# Patient Record
Sex: FEMALE | Race: WHITE | NOT HISPANIC OR LATINO | Employment: OTHER | URBAN - METROPOLITAN AREA
[De-identification: names, ages, dates, MRNs, and addresses within clinical notes are randomized per-mention and may not be internally consistent; named-entity substitution may affect disease eponyms.]

---

## 2021-12-15 ENCOUNTER — HOSPITAL ENCOUNTER (EMERGENCY)
Facility: CLINIC | Age: 81
Discharge: HOME | End: 2021-12-15
Attending: EMERGENCY MEDICINE | Admitting: EMERGENCY MEDICINE
Payer: MEDICARE

## 2021-12-15 ENCOUNTER — APPOINTMENT (OUTPATIENT)
Dept: CT IMAGING | Facility: CLINIC | Age: 81
End: 2021-12-15
Attending: EMERGENCY MEDICINE
Payer: MEDICARE

## 2021-12-15 VITALS
SYSTOLIC BLOOD PRESSURE: 148 MMHG | OXYGEN SATURATION: 95 % | WEIGHT: 140 LBS | DIASTOLIC BLOOD PRESSURE: 77 MMHG | HEIGHT: 65 IN | HEART RATE: 88 BPM | BODY MASS INDEX: 23.32 KG/M2 | RESPIRATION RATE: 18 BRPM | TEMPERATURE: 98 F

## 2021-12-15 DIAGNOSIS — R53.1 WEAKNESS: ICD-10-CM

## 2021-12-15 LAB
ALBUMIN UR-MCNC: NEGATIVE MG/DL
AMORPH CRY #/AREA URNS HPF: ABNORMAL /HPF
ANION GAP SERPL CALCULATED.3IONS-SCNC: 5 MMOL/L (ref 3–14)
APPEARANCE UR: ABNORMAL
BASOPHILS # BLD AUTO: 0.1 10E3/UL (ref 0–0.2)
BASOPHILS NFR BLD AUTO: 1 %
BILIRUB UR QL STRIP: NEGATIVE
BUN SERPL-MCNC: 10 MG/DL (ref 7–30)
CALCIUM SERPL-MCNC: 8.8 MG/DL (ref 8.5–10.1)
CHLORIDE BLD-SCNC: 107 MMOL/L (ref 94–109)
CO2 SERPL-SCNC: 26 MMOL/L (ref 20–32)
COLOR UR AUTO: ABNORMAL
CREAT SERPL-MCNC: 0.78 MG/DL (ref 0.52–1.04)
EOSINOPHIL # BLD AUTO: 0.5 10E3/UL (ref 0–0.7)
EOSINOPHIL NFR BLD AUTO: 7 %
ERYTHROCYTE [DISTWIDTH] IN BLOOD BY AUTOMATED COUNT: 14.1 % (ref 10–15)
GFR SERPL CREATININE-BSD FRML MDRD: 72 ML/MIN/1.73M2
GLUCOSE BLD-MCNC: 112 MG/DL (ref 70–99)
GLUCOSE UR STRIP-MCNC: NEGATIVE MG/DL
HCT VFR BLD AUTO: 42.6 % (ref 35–47)
HGB BLD-MCNC: 13.3 G/DL (ref 11.7–15.7)
HGB UR QL STRIP: NEGATIVE
HOLD SPECIMEN: NORMAL
IMM GRANULOCYTES # BLD: 0 10E3/UL
IMM GRANULOCYTES NFR BLD: 0 %
INR PPP: 2.48 (ref 0.85–1.15)
KETONES UR STRIP-MCNC: NEGATIVE MG/DL
LEUKOCYTE ESTERASE UR QL STRIP: NEGATIVE
LYMPHOCYTES # BLD AUTO: 1.7 10E3/UL (ref 0.8–5.3)
LYMPHOCYTES NFR BLD AUTO: 24 %
MAGNESIUM SERPL-MCNC: 2.4 MG/DL (ref 1.6–2.3)
MCH RBC QN AUTO: 28.4 PG (ref 26.5–33)
MCHC RBC AUTO-ENTMCNC: 31.2 G/DL (ref 31.5–36.5)
MCV RBC AUTO: 91 FL (ref 78–100)
MONOCYTES # BLD AUTO: 0.5 10E3/UL (ref 0–1.3)
MONOCYTES NFR BLD AUTO: 6 %
MUCOUS THREADS #/AREA URNS LPF: PRESENT /LPF
NEUTROPHILS # BLD AUTO: 4.4 10E3/UL (ref 1.6–8.3)
NEUTROPHILS NFR BLD AUTO: 62 %
NITRATE UR QL: NEGATIVE
NRBC # BLD AUTO: 0 10E3/UL
NRBC BLD AUTO-RTO: 0 /100
PH UR STRIP: 7 [PH] (ref 5–7)
PLATELET # BLD AUTO: 413 10E3/UL (ref 150–450)
POTASSIUM BLD-SCNC: 3.5 MMOL/L (ref 3.4–5.3)
RBC # BLD AUTO: 4.68 10E6/UL (ref 3.8–5.2)
RBC URINE: <1 /HPF
SODIUM SERPL-SCNC: 138 MMOL/L (ref 133–144)
SP GR UR STRIP: 1.02 (ref 1–1.03)
SQUAMOUS EPITHELIAL: <1 /HPF
TROPONIN I SERPL HS-MCNC: 7 NG/L
TSH SERPL DL<=0.005 MIU/L-ACNC: 1.86 MU/L (ref 0.4–4)
UROBILINOGEN UR STRIP-MCNC: NORMAL MG/DL
WBC # BLD AUTO: 7.1 10E3/UL (ref 4–11)
WBC URINE: 1 /HPF

## 2021-12-15 PROCEDURE — 85610 PROTHROMBIN TIME: CPT | Performed by: EMERGENCY MEDICINE

## 2021-12-15 PROCEDURE — 93005 ELECTROCARDIOGRAM TRACING: CPT

## 2021-12-15 PROCEDURE — 80048 BASIC METABOLIC PNL TOTAL CA: CPT | Performed by: EMERGENCY MEDICINE

## 2021-12-15 PROCEDURE — 81001 URINALYSIS AUTO W/SCOPE: CPT | Performed by: EMERGENCY MEDICINE

## 2021-12-15 PROCEDURE — 85025 COMPLETE CBC W/AUTO DIFF WBC: CPT | Performed by: EMERGENCY MEDICINE

## 2021-12-15 PROCEDURE — 36415 COLL VENOUS BLD VENIPUNCTURE: CPT | Performed by: EMERGENCY MEDICINE

## 2021-12-15 PROCEDURE — 70450 CT HEAD/BRAIN W/O DYE: CPT | Mod: ME

## 2021-12-15 PROCEDURE — 99285 EMERGENCY DEPT VISIT HI MDM: CPT | Mod: 25

## 2021-12-15 PROCEDURE — 84484 ASSAY OF TROPONIN QUANT: CPT | Performed by: EMERGENCY MEDICINE

## 2021-12-15 PROCEDURE — 83735 ASSAY OF MAGNESIUM: CPT | Performed by: EMERGENCY MEDICINE

## 2021-12-15 PROCEDURE — 84443 ASSAY THYROID STIM HORMONE: CPT | Performed by: EMERGENCY MEDICINE

## 2021-12-15 ASSESSMENT — ENCOUNTER SYMPTOMS
TREMORS: 1
WEAKNESS: 1
NUMBNESS: 0
DIZZINESS: 0
SHORTNESS OF BREATH: 0
VOMITING: 0
NAUSEA: 0
FREQUENCY: 0
FEVER: 0
ABDOMINAL PAIN: 0

## 2021-12-15 ASSESSMENT — MIFFLIN-ST. JEOR: SCORE: 1100.92

## 2021-12-15 NOTE — ED PROVIDER NOTES
"  History   Chief Complaint:  Generalized Weakness       The history is provided by the patient.      Lawanda Crawford is a 81 year old female with history of hypertension, hypercholesterolemia, and PE, on Warfarin, who presents with generalized weakness. Last night while watching TV, she had a sudden sense of whole body weakness. She also feels a sense of whole body shakiness. The patient does mention that she is a little behind on getting an INR test. She denies any dizziness, numbness, tingling, and syncope. She also denies any recent fever, vision changes, shortness of breath, chest pain, abdominal pain, nausea, vomiting, urinary frequency, or urinary urgency. She also denies any recent travel or history of stroke.     Review of Systems   Constitutional: Negative for fever.   Eyes: Negative for visual disturbance.   Respiratory: Negative for shortness of breath.    Cardiovascular: Negative for chest pain.   Gastrointestinal: Negative for abdominal pain, nausea and vomiting.   Genitourinary: Negative for frequency and urgency.   Neurological: Positive for tremors and weakness. Negative for dizziness, syncope and numbness.   All other systems reviewed and are negative.    Allergies:  Ofloxacin    Medications:  Amlodipine  Lexapro  Simvastatin  Warfarin    Past Medical History:     Major depressive disorder  Osteoporosis  Hypercholesterolemia  Hypertension  PE  Refractory sinusitis     Past Surgical History:    Melanoma excision  Bilateral cataract extraction with intraocular lens implant  Sinus surgery     Family History:    Mother: hypertension    Social History:  The patient presents to the ED alone. She is a retired nurse. She has a dog. For 15 years, she was a .     Physical Exam     Patient Vitals for the past 24 hrs:   BP Temp Temp src Pulse Resp SpO2 Height Weight   12/15/21 0915 (!) 148/77 98  F (36.7  C) Oral 88 18 95 % 1.651 m (5' 5\") 63.5 kg (140 lb)       Physical Exam "     General: Alert, appears well-developed and well-nourished. Cooperative.     In no distress  HEENT:  Head:  Atraumatic  Ears:  External ears are normal  Mouth/Throat:  Oropharynx is without erythema or exudate and mucous membranes are moist.   Eyes:   Conjunctivae normal and EOM are normal. No scleral icterus.    Pupils are equal, round, and reactive to light.   Neck:   Normal range of motion. Neck supple.  CV:  Normal rate, regular rhythm, normal heart sounds and radial pulses are 2+ and symmetric.  No murmur.  Resp:  Breath sounds are clear bilaterally    Non-labored, no retractions or accessory muscle use  GI:  Abdomen is soft, no distension, no tenderness. No rebound or guarding.  No CVA tenderness bilaterally  MS:  Normal range of motion. No edema.    Normal strength in all 4 extremities.     Back atraumatic.    No midline cervical, thoracic, or lumbar tenderness  Skin:  Warm and dry.  No rash or lesions noted.  Neuro:   Alert. Normal strength.  Sensation intact in all 4 extremities. GCS: 15    Cranial nerves 2-12 intact.  Psych: Normal mood and affect.  Lymph: No anterior or posterior cervical lymphadenopathy noted.      Emergency Department Course   ECG  ECG obtained at 1103, ECG read at 1111  Normal sinus rhythm. Low voltage QRS. Nonspecific T wave abnormality. Abnormal ECG.    Rate 73 bpm. WA interval 134 ms. QRS duration 82 ms. QT/QTc 406/447 ms. P-R-T axes 10 22 25.     Imaging:  CT Head w/o Contrast  Diffuse cerebral volume loss and cerebral white matter  changes consistent with chronic small vessel ischemic disease. No  evidence for acute intracranial pathology.  Report per radiology    Laboratory:  UA with microscopic: slightly cloudy appearance, mucus present, moderate amorphous crystals o/w WNL      Troponin (Collected 0925): 7    TSH with free T4 reflex: 1.86    Magnesium: 2.4 (H)    BMP: glucose 112 (H) o/w WNL (Creatinine 0.78)    INR: 2. 48 (H)    CBC: WBC 7.1, HGB 13.3,      Emergency  Department Course:  Reviewed:  I reviewed nursing notes, vitals, past medical history, Care Everywhere and [facility]    Assessments:  0947 I obtained history and examined the patient as noted above.   1235 I rechecked the patient and explained findings.     Disposition:  The patient was discharged to home.     Impression & Plan     CMS Diagnoses: None    Medical Decision Making:  Lawanda Crawford is a 81 year old female who presents for evaluation of weakness.  Associated symptoms today have included mild shakiness. The workup here in the emergency room was to evaluate for infections, metabolic, electrolyte, neurologic, muscle or cardiovascular causes.  Of note, there are no signs of pneumonia or UTI causing the symptoms.  In addition, I do not believe symptoms are due to CVA, TIA, myasthesia gravis, myopathy or acute coronary syndromes.  History is benign, exam & laboratory tests reassuring, and head CT negative for ICH.  Doubt spinal pathology or other central etiology of symptoms.  They were ambulated in ED and did well.  I believe supportive outpatient management is indicated; will have them followup with their primary care doctor in 1-2 days for a recheck. Return for any additional symptoms or worsening weakness.      Diagnosis:    ICD-10-CM    1. Weakness  R53.1      Scribe Disclosure:  I, Cheyanne Myers, am serving as a scribe at 9:20 AM on 12/15/2021 to document services personally performed by Hero Torres MD based on my observations and the provider's statements to me.              Hero Torres MD  12/15/21 1768

## 2021-12-16 LAB
ATRIAL RATE - MUSE: 73 BPM
DIASTOLIC BLOOD PRESSURE - MUSE: NORMAL MMHG
INTERPRETATION ECG - MUSE: NORMAL
P AXIS - MUSE: 10 DEGREES
PR INTERVAL - MUSE: 134 MS
QRS DURATION - MUSE: 82 MS
QT - MUSE: 406 MS
QTC - MUSE: 447 MS
R AXIS - MUSE: 22 DEGREES
SYSTOLIC BLOOD PRESSURE - MUSE: NORMAL MMHG
T AXIS - MUSE: 25 DEGREES
VENTRICULAR RATE- MUSE: 73 BPM

## 2022-01-15 ENCOUNTER — HOSPITAL ENCOUNTER (OUTPATIENT)
Facility: CLINIC | Age: 82
Setting detail: OBSERVATION
Discharge: HOME | End: 2022-01-17
Attending: EMERGENCY MEDICINE | Admitting: INTERNAL MEDICINE
Payer: MEDICARE

## 2022-01-15 ENCOUNTER — APPOINTMENT (OUTPATIENT)
Dept: MRI IMAGING | Facility: CLINIC | Age: 82
End: 2022-01-15
Attending: EMERGENCY MEDICINE
Payer: MEDICARE

## 2022-01-15 DIAGNOSIS — R42 DIZZINESS: ICD-10-CM

## 2022-01-15 DIAGNOSIS — E87.6 HYPOKALEMIA: ICD-10-CM

## 2022-01-15 LAB
ANION GAP SERPL CALCULATED.3IONS-SCNC: 6 MMOL/L (ref 3–14)
BASOPHILS # BLD AUTO: 0.1 10E3/UL (ref 0–0.2)
BASOPHILS NFR BLD AUTO: 1 %
BUN SERPL-MCNC: 12 MG/DL (ref 7–30)
CALCIUM SERPL-MCNC: 8.3 MG/DL (ref 8.5–10.1)
CHLORIDE BLD-SCNC: 106 MMOL/L (ref 94–109)
CO2 SERPL-SCNC: 25 MMOL/L (ref 20–32)
CREAT SERPL-MCNC: 0.7 MG/DL (ref 0.52–1.04)
EOSINOPHIL # BLD AUTO: 0.4 10E3/UL (ref 0–0.7)
EOSINOPHIL NFR BLD AUTO: 7 %
ERYTHROCYTE [DISTWIDTH] IN BLOOD BY AUTOMATED COUNT: 14.1 % (ref 10–15)
FLUAV RNA SPEC QL NAA+PROBE: NEGATIVE
FLUBV RNA RESP QL NAA+PROBE: NEGATIVE
GFR SERPL CREATININE-BSD FRML MDRD: 86 ML/MIN/1.73M2
GLUCOSE BLD-MCNC: 101 MG/DL (ref 70–99)
HCT VFR BLD AUTO: 38.6 % (ref 35–47)
HGB BLD-MCNC: 12.3 G/DL (ref 11.7–15.7)
IMM GRANULOCYTES # BLD: 0 10E3/UL
IMM GRANULOCYTES NFR BLD: 0 %
INR PPP: 2.67 (ref 0.85–1.15)
LYMPHOCYTES # BLD AUTO: 1.6 10E3/UL (ref 0.8–5.3)
LYMPHOCYTES NFR BLD AUTO: 26 %
MCH RBC QN AUTO: 28.5 PG (ref 26.5–33)
MCHC RBC AUTO-ENTMCNC: 31.9 G/DL (ref 31.5–36.5)
MCV RBC AUTO: 89 FL (ref 78–100)
MONOCYTES # BLD AUTO: 0.5 10E3/UL (ref 0–1.3)
MONOCYTES NFR BLD AUTO: 9 %
NEUTROPHILS # BLD AUTO: 3.5 10E3/UL (ref 1.6–8.3)
NEUTROPHILS NFR BLD AUTO: 57 %
NRBC # BLD AUTO: 0 10E3/UL
NRBC BLD AUTO-RTO: 0 /100
PLATELET # BLD AUTO: 347 10E3/UL (ref 150–450)
POTASSIUM BLD-SCNC: 3.3 MMOL/L (ref 3.4–5.3)
RBC # BLD AUTO: 4.32 10E6/UL (ref 3.8–5.2)
SARS-COV-2 RNA RESP QL NAA+PROBE: NEGATIVE
SODIUM SERPL-SCNC: 137 MMOL/L (ref 133–144)
WBC # BLD AUTO: 6.1 10E3/UL (ref 4–11)

## 2022-01-15 PROCEDURE — 36415 COLL VENOUS BLD VENIPUNCTURE: CPT

## 2022-01-15 PROCEDURE — G1004 CDSM NDSC: HCPCS

## 2022-01-15 PROCEDURE — 80048 BASIC METABOLIC PNL TOTAL CA: CPT

## 2022-01-15 PROCEDURE — 85025 COMPLETE CBC W/AUTO DIFF WBC: CPT

## 2022-01-15 PROCEDURE — 85610 PROTHROMBIN TIME: CPT

## 2022-01-15 PROCEDURE — 96361 HYDRATE IV INFUSION ADD-ON: CPT

## 2022-01-15 PROCEDURE — 99285 EMERGENCY DEPT VISIT HI MDM: CPT | Mod: 25

## 2022-01-15 PROCEDURE — 87636 SARSCOV2 & INF A&B AMP PRB: CPT

## 2022-01-15 PROCEDURE — 93005 ELECTROCARDIOGRAM TRACING: CPT

## 2022-01-15 PROCEDURE — G0378 HOSPITAL OBSERVATION PER HR: HCPCS

## 2022-01-15 PROCEDURE — 258N000003 HC RX IP 258 OP 636

## 2022-01-15 PROCEDURE — 250N000013 HC RX MED GY IP 250 OP 250 PS 637

## 2022-01-15 PROCEDURE — 99220 PR INITIAL OBSERVATION CARE,LEVEL III: CPT | Performed by: INTERNAL MEDICINE

## 2022-01-15 PROCEDURE — 250N000013 HC RX MED GY IP 250 OP 250 PS 637: Performed by: EMERGENCY MEDICINE

## 2022-01-15 PROCEDURE — C9803 HOPD COVID-19 SPEC COLLECT: HCPCS

## 2022-01-15 RX ORDER — AMLODIPINE BESYLATE 10 MG/1
5 TABLET ORAL DAILY
COMMUNITY
Start: 2021-12-20

## 2022-01-15 RX ORDER — WARFARIN SODIUM 5 MG/1
TABLET ORAL
COMMUNITY
Start: 2021-11-12

## 2022-01-15 RX ORDER — POTASSIUM CHLORIDE 1500 MG/1
20 TABLET, EXTENDED RELEASE ORAL ONCE
Status: COMPLETED | OUTPATIENT
Start: 2022-01-15 | End: 2022-01-15

## 2022-01-15 RX ORDER — ESCITALOPRAM OXALATE 20 MG/1
20 TABLET ORAL DAILY
COMMUNITY
Start: 2021-12-11

## 2022-01-15 RX ORDER — DIAZEPAM 5 MG
5 TABLET ORAL ONCE
Status: COMPLETED | OUTPATIENT
Start: 2022-01-15 | End: 2022-01-15

## 2022-01-15 RX ORDER — SIMVASTATIN 20 MG
20 TABLET ORAL AT BEDTIME
COMMUNITY
Start: 2021-12-01

## 2022-01-15 RX ORDER — MECLIZINE HYDROCHLORIDE 25 MG/1
25 TABLET ORAL ONCE
Status: COMPLETED | OUTPATIENT
Start: 2022-01-15 | End: 2022-01-15

## 2022-01-15 RX ORDER — CHOLECALCIFEROL (VITAMIN D3) 50 MCG
1 TABLET ORAL DAILY
COMMUNITY
End: 2023-10-30

## 2022-01-15 RX ADMIN — MECLIZINE HYDROCHLORIDE 25 MG: 25 TABLET ORAL at 20:38

## 2022-01-15 RX ADMIN — DIAZEPAM 5 MG: 5 TABLET ORAL at 21:31

## 2022-01-15 RX ADMIN — POTASSIUM CHLORIDE 20 MEQ: 1500 TABLET, EXTENDED RELEASE ORAL at 20:38

## 2022-01-15 RX ADMIN — SODIUM CHLORIDE 500 ML: 9 INJECTION, SOLUTION INTRAVENOUS at 19:24

## 2022-01-16 LAB
ATRIAL RATE - MUSE: 88 BPM
DIASTOLIC BLOOD PRESSURE - MUSE: NORMAL MMHG
HOLD SPECIMEN: NORMAL
INR PPP: 2.45 (ref 0.85–1.15)
INTERPRETATION ECG - MUSE: NORMAL
P AXIS - MUSE: 23 DEGREES
POTASSIUM BLD-SCNC: 3.5 MMOL/L (ref 3.4–5.3)
PR INTERVAL - MUSE: 144 MS
QRS DURATION - MUSE: 76 MS
QT - MUSE: 376 MS
QTC - MUSE: 454 MS
R AXIS - MUSE: 53 DEGREES
SYSTOLIC BLOOD PRESSURE - MUSE: NORMAL MMHG
T AXIS - MUSE: 3 DEGREES
VENTRICULAR RATE- MUSE: 88 BPM

## 2022-01-16 PROCEDURE — 96360 HYDRATION IV INFUSION INIT: CPT

## 2022-01-16 PROCEDURE — 36415 COLL VENOUS BLD VENIPUNCTURE: CPT | Performed by: INTERNAL MEDICINE

## 2022-01-16 PROCEDURE — 99224 PR SUBSEQUENT OBSERVATION CARE,LEVEL I: CPT | Performed by: PHYSICIAN ASSISTANT

## 2022-01-16 PROCEDURE — G0378 HOSPITAL OBSERVATION PER HR: HCPCS

## 2022-01-16 PROCEDURE — 99207 PR MOONLIGHTING INDICATOR: CPT | Performed by: PHYSICIAN ASSISTANT

## 2022-01-16 PROCEDURE — 250N000013 HC RX MED GY IP 250 OP 250 PS 637: Performed by: INTERNAL MEDICINE

## 2022-01-16 PROCEDURE — 258N000003 HC RX IP 258 OP 636: Performed by: INTERNAL MEDICINE

## 2022-01-16 PROCEDURE — 85610 PROTHROMBIN TIME: CPT | Performed by: INTERNAL MEDICINE

## 2022-01-16 PROCEDURE — 84132 ASSAY OF SERUM POTASSIUM: CPT | Performed by: INTERNAL MEDICINE

## 2022-01-16 RX ORDER — WARFARIN SODIUM 5 MG/1
5 TABLET ORAL ONCE
Status: COMPLETED | OUTPATIENT
Start: 2022-01-16 | End: 2022-01-16

## 2022-01-16 RX ORDER — ACETAMINOPHEN 325 MG/1
650 TABLET ORAL EVERY 6 HOURS PRN
Status: DISCONTINUED | OUTPATIENT
Start: 2022-01-16 | End: 2022-01-17 | Stop reason: HOSPADM

## 2022-01-16 RX ORDER — AMOXICILLIN 250 MG
2 CAPSULE ORAL 2 TIMES DAILY PRN
Status: DISCONTINUED | OUTPATIENT
Start: 2022-01-16 | End: 2022-01-17 | Stop reason: HOSPADM

## 2022-01-16 RX ORDER — SODIUM CHLORIDE 9 MG/ML
INJECTION, SOLUTION INTRAVENOUS CONTINUOUS
Status: ACTIVE | OUTPATIENT
Start: 2022-01-16 | End: 2022-01-16

## 2022-01-16 RX ORDER — ESCITALOPRAM OXALATE 20 MG/1
20 TABLET ORAL DAILY
Status: DISCONTINUED | OUTPATIENT
Start: 2022-01-16 | End: 2022-01-17 | Stop reason: HOSPADM

## 2022-01-16 RX ORDER — ONDANSETRON 4 MG/1
4 TABLET, ORALLY DISINTEGRATING ORAL EVERY 6 HOURS PRN
Status: DISCONTINUED | OUTPATIENT
Start: 2022-01-16 | End: 2022-01-17 | Stop reason: HOSPADM

## 2022-01-16 RX ORDER — ONDANSETRON 2 MG/ML
4 INJECTION INTRAMUSCULAR; INTRAVENOUS EVERY 6 HOURS PRN
Status: DISCONTINUED | OUTPATIENT
Start: 2022-01-16 | End: 2022-01-17 | Stop reason: HOSPADM

## 2022-01-16 RX ORDER — DIPHENHYDRAMINE HYDROCHLORIDE 50 MG/ML
25 INJECTION INTRAMUSCULAR; INTRAVENOUS EVERY 6 HOURS PRN
Status: DISCONTINUED | OUTPATIENT
Start: 2022-01-16 | End: 2022-01-17 | Stop reason: HOSPADM

## 2022-01-16 RX ORDER — AMOXICILLIN 250 MG
1 CAPSULE ORAL 2 TIMES DAILY PRN
Status: DISCONTINUED | OUTPATIENT
Start: 2022-01-16 | End: 2022-01-17 | Stop reason: HOSPADM

## 2022-01-16 RX ORDER — AMLODIPINE BESYLATE 10 MG/1
10 TABLET ORAL DAILY
Status: DISCONTINUED | OUTPATIENT
Start: 2022-01-16 | End: 2022-01-17 | Stop reason: HOSPADM

## 2022-01-16 RX ORDER — ACETAMINOPHEN 650 MG/1
650 SUPPOSITORY RECTAL EVERY 6 HOURS PRN
Status: DISCONTINUED | OUTPATIENT
Start: 2022-01-16 | End: 2022-01-17 | Stop reason: HOSPADM

## 2022-01-16 RX ORDER — MECLIZINE HCL 12.5 MG 12.5 MG/1
12.5 TABLET ORAL 3 TIMES DAILY PRN
Status: DISCONTINUED | OUTPATIENT
Start: 2022-01-16 | End: 2022-01-17 | Stop reason: HOSPADM

## 2022-01-16 RX ADMIN — WARFARIN SODIUM 5 MG: 5 TABLET ORAL at 19:13

## 2022-01-16 RX ADMIN — AMLODIPINE BESYLATE 10 MG: 10 TABLET ORAL at 08:04

## 2022-01-16 RX ADMIN — ESCITALOPRAM OXALATE 20 MG: 20 TABLET ORAL at 08:04

## 2022-01-16 RX ADMIN — SODIUM CHLORIDE: 9 INJECTION, SOLUTION INTRAVENOUS at 01:39

## 2022-01-16 RX ADMIN — MECLIZINE 12.5 MG: 12.5 TABLET ORAL at 17:02

## 2022-01-16 ASSESSMENT — MIFFLIN-ST. JEOR: SCORE: 1140.83

## 2022-01-16 NOTE — PROGRESS NOTES
[facility]  Hospitalist Progress Note  Date of Service (when I saw the patient): 01/16/2022    Summary:  Lawanda Crawford is a 81 year old year old female who has a PMH significant for HTN on amlodipine, HLD on simvastatin, PE on warfarin and depressive disorder on Lexapro. Pt was admitted to [facility] on 1/15/2022 after presenting with complaints of unsteadiness and vertigo.  The following problems were addressed during her hospitalization:    Assessment & Plan:  Suspected vertigo  Has had similar sx in past. Was in ED 12/15 for same, CT head negative at time, TSH normal. Sx lasted less than a day. Then  ~3 hours PTA with sudden onset of weakness and unsteadiness again. Laid down in bed and called EMS. Denies other neuro sx. K 3.3 otherwise labs normal. Exam with horizontal nystagmus beating to L. MRI without acute infarct, incidental 5mm meningioma seen without mass effect or edema. Orthostatics negative 1/16  - Continue telemetry   - gentle fluids  - PT for vestibular therapy  --Unfortunately, no vestibular PT available today, assessment scheduled for tomorrow morning  - prns available for dizziness     Hypokalemia  K 3.3  - replace per protocol     Hx recurrent PE  [warfarin]  INR 2.67 on presentation.  - pharmacy consult for warfarin     Hypertension  HLD  [amlodipine 10 mg daily, simvastatin 20 mg at HS]  - hold statin in obs  - continue amlodipine     Depression  [escitalopram 20 mg daily]  - Continue     COVID-19 negative      DVT Prophylaxis: Warfarin  Code Status: No CPR- Do NOT Intubate  Disposition: Expected discharge in 1-2 days once PT evaluation complete.    The patient's care was discussed with the Attending Physician, Dr. Gu, Bedside Nurse, Care Coordinator/, and Patient.    Radha Xiong PA-C    Interval History   Feeling ok today. Still some dizziness at times with rest and with movement. PRNs don't seem to help much. No PT yet,  but pt hopeful that it will be helpful. Discussed causes of vertigo and course will likely take some time to improve (days/weeks vs hours). Pt very understanding. Denies fevers, chills, nausea, vomiting, chest pain, difficulty breathing, headache, or confusion.     -Data reviewed today: I reviewed all new labs and imaging results over the last 24 hours. I personally reviewed no images or EKG's today.    Physical Exam   Temp: 96.8  F (36  C) Temp src: Oral BP: 130/71 Pulse: 91   Resp: 16 SpO2: 95 % O2 Device: None (Room air)    Vitals:    01/16/22 0102   Weight: 67.5 kg (148 lb 12.8 oz)     Vital Signs with Ranges  Temp:  [96.8  F (36  C)-98.9  F (37.2  C)] 96.8  F (36  C)  Pulse:  [76-91] 91  Resp:  [16-20] 16  BP: (123-143)/(70-79) 130/71  SpO2:  [92 %-96 %] 95 %  No intake/output data recorded.    Constitutional: alert, oriented, no acute distress  Eyes: No scleral icterus or injection  ENT: Normocephalic, atraumatic, moist mucus membranes  Respiratory: No secondary muscle use or labored breathing. Lungs clear to auscultation bilaterally without wheezes or rhonchi   Cardiovascular: RRR without murmur  GI: Abdomen soft, non-tender to palpation, non-distended.  Bowel sounds active  MSK: able to move extremities on command without weakness, walks without weakness or ataxia  Skin/Integumen: warm, dry, in tact without rash or hives  Neuro:CN II-XII grossly in tact    Medications     sodium chloride 75 mL/hr at 01/16/22 0139       amLODIPine  10 mg Oral Daily     escitalopram  20 mg Oral Daily     Data   Recent Labs   Lab 01/16/22  0255 01/15/22  1902   WBC  --  6.1   HGB  --  12.3   MCV  --  89   PLT  --  347   INR  --  2.67*   NA  --  137   POTASSIUM 3.5 3.3*   CHLORIDE  --  106   CO2  --  25   BUN  --  12   CR  --  0.70   ANIONGAP  --  6   VINCE  --  8.3*   GLC  --  101*       Recent Results (from the past 24 hour(s))   MR Brain w/o Contrast    Narrative    EXAM: MR BRAIN W/O CONTRAST  LOCATION: [facility]  [facility]  DATE/TIME: 1/15/2022 11:29 PM    INDICATION: Dizziness, non-specific  COMPARISON: None.  TECHNIQUE: Routine multiplanar multisequence head MRI without intravenous contrast.    FINDINGS:  INTRACRANIAL CONTENTS: No acute or subacute infarct. No large mass, acute hemorrhage, or extra-axial fluid collections. Patchy and confluent nonspecific T2/FLAIR hyperintensities within the cerebral white matter most consistent with moderate chronic   microvascular ischemic change. Mild generalized cerebral atrophy. No hydrocephalus. Normal position of the cerebellar tonsils. Left parietal convexity small 5 mm extra-axial lesion nonspecific likely incidental meningioma. No significant mass effect on   the adjacent brain parenchyma. No adjacent vasogenic edema.    SELLA: No abnormality accounting for technique.    OSSEOUS STRUCTURES/SOFT TISSUES: Normal marrow signal. The major intracranial vascular flow voids are maintained.     ORBITS: No abnormality accounting for technique.     SINUSES/MASTOIDS: Mild mucosal thickening scattered about the paranasal sinuses. No middle ear or mastoid effusion.       Impression    IMPRESSION:    1.  No acute infarct.  2.  Age-related changes described above.  3.  Left parietal convexity small 5 mm extra-axial lesion nonspecific likely incidental meningioma. No significant mass effect on the adjacent brain parenchyma. No adjacent vasogenic edema.

## 2022-01-16 NOTE — ED NOTES
Bed: ED15  Expected date:   Expected time:   Means of arrival:   Comments:  451  82 F dizziness  1811

## 2022-01-16 NOTE — PROVIDER NOTIFICATION
MD Notification    Notified Person: MD    Notified Person Name:  Jamaal     Notification Date/Time: 01/16/22 @ 02:08    Notification Interaction:    Purpose of Notification:   FYI: k+ was replaced @ 2038 but a recheck was not ordered. Pt doesn't have a K+ RN managed order in place.     Orders Received:    Comments:

## 2022-01-16 NOTE — H&P
"[facility]    History and Physical  Hospitalist       Date of Admission:  1/15/2022  Date of Service (when I saw the patient): 01/15/22    Assessment & Plan   Lawanda Crawford is a 81 year old female who presents with unsteadiness, dizzines.     Suspected vertigo  Has had similar sx in past. Was in ED 12/15 for same, CT head negative at time, TSH normal. Sx lasted less than a day. Then  ~3 hours PTA with sudden onset of weakness and unsteadiness again. Laid down in bed and called EMS. Denies other neuro sx. K 3.3 otherwise labs normal. Exam with horizontal nystagmus beating to L. MRI without acute infarct, incidental 5mm meningioma seen without mass effect or edema.   - telemetry   - orthostatics  - gentle fluids  - PT for vestibular therapy  - prns available for dizziness    Hypokalemia  K 3.3  - replace per protocol    Hx recurrent PE  [warfarin]  INR 2.67 on presentation.  - pharmacy consult for warfarin    Hypertension  HLD  [amlodipine 10 mg daily, simvastatin 20 mg at HS]  - hold statin in obs  - continue amlodipine    Depression  [escitalopram 20 mg daily]  - resume    COVID-19 negative    DVT Prophylaxis: Ambulate every shift  Code Status: DNR / DNI    Disposition: Expected discharge in 1-2 days     Yash Hinton MD  [phone] (P)  Text Page     Primary Care Physician   Physician No Ref-Primary    Chief Complaint   dizziness    History is obtained from the patient and medical records    History of Present Illness   Lawanda Crawford is a 81 year old female who presents with dizziness and an abnormal sensation in her torso.  She has had \"fluttering in her torso \"in the past and on December 15 presented to the hospital because it became very severe.  She describes it as being contained in her torso goes in the front from her chest down to her lower abdomen.  She describes it as a fluttering sensation.  She feels like it is her whole anterior.  She had no chest pain or " palpitations with this.  Denies any nausea or abdominal pain.  She did not have dizziness.  She did not have any vision changes.  Evaluation was done in the emergency department at the time including CT head which was negative and blood work was generally unremarkable.  She was sent home on states his symptoms resolved by the next day.  She states she was back to her self again and then on the day of presentation she was watching television then something happened.  She states she got the fluttering sensation in her chest but she also got dizziness with a spinning aspect she describes.  Denies any chest pain or palpitations.  She states when she got up to walk she was dizzy.  No new vision problems.  No weakness.  No numbness or tingling.  She had no recent illnesses that she knows of.  Symptoms are at rest and with motion.    Past Medical History    I have reviewed this patient's medical history and updated it with pertinent information if needed.   Recurrent pulmonary emboli  Hypertension  Depression    Past Surgical History   I have reviewed this patient's surgical history and updated it with pertinent information if needed.  No past surgical history on file.    Prior to Admission Medications   Prior to Admission Medications   Prescriptions Last Dose Informant Patient Reported? Taking?   amLODIPine (NORVASC) 10 MG tablet 1/15/2022 at am  Yes Yes   Sig: Take 10 mg by mouth daily   escitalopram (LEXAPRO) 20 MG tablet 1/15/2022 at am  Yes Yes   Sig: Take 20 mg by mouth daily   simvastatin (ZOCOR) 20 MG tablet 1/14/2022 at pm  Yes Yes   Sig: Take 20 mg by mouth At Bedtime   vitamin D3 (CHOLECALCIFEROL) 50 mcg (2000 units) tablet 1/15/2022 at am  Yes Yes   Sig: Take 1 tablet by mouth daily   warfarin ANTICOAGULANT (COUMADIN) 5 MG tablet 1/14/2022 at pm x7.5 mg  Yes Yes   Sig: Take 5 mg (5 mg x 1 TABLET) BY MOUTH every Sun, Tue, Thu; AND TAKE 7.5 mg (5 mg x 1.5 TABLETS) all other days OR as directed by INR clinic.       Facility-Administered Medications: None     Allergies   Allergies   Allergen Reactions     Ofloxacin      light headedness       Social History   I have reviewed this patient's social history and updated it with pertinent information if needed. Lawanda Crawford  denies alcohol or tobacco use.     Family History   I have reviewed this patient's family history and updated it with pertinent information if needed.   Includes heart disease in grandparents, HTN/ CVA in MGM, mom with hypertension.     Review of Systems   The 10 point Review of Systems is negative other than noted in the HPI or here.     Physical Exam   Temp: 98.9  F (37.2  C) Temp src: Temporal BP: 137/73 Pulse: 80   Resp: 16 SpO2: 92 % O2 Device: None (Room air)    Vital Signs with Ranges  0 lbs 0 oz    Constitutional: alert, oriented and in no acute distress  Eyes: EOMI, PERRL. L sided nystagmus noted  HEENT: OP clear  Respiratory: CTA B without w/c  Cardiovascular: RRR ELIZABETH noted. no edema.  GI: soft, nontender, nondistended, BS present  Lymph/Hematologic: no cervical LAD  Genitourinary: deferred  Skin: no rashes or lesions grossly  Musculoskeletal: no deformities or arthritis  Neurologic: CN II-XII, BOATENG  Psychiatric: mood and affect wnl    Data   Data reviewed today:  I personally reviewed the EKG tracing showing NSR and the brain MRI image(s) showing no acute CVA pathology.  Recent Labs   Lab 01/15/22  1902   WBC 6.1   HGB 12.3   MCV 89      INR 2.67*      POTASSIUM 3.3*   CHLORIDE 106   CO2 25   BUN 12   CR 0.70   ANIONGAP 6   VINCE 8.3*   *       No results found for this or any previous visit (from the past 24 hour(s)).

## 2022-01-16 NOTE — ED PROVIDER NOTES
Emergency Department Attending Supervision Note  1/15/2022  10:45 PM    I evaluated this patient in conjunction with PASQUALE Oliver    Briefly, the patient presented with concern of dizziness.  This began this afternoon.  She has had this before.  She has been seen for this before and had unremarkable work-up.    On my exam:  Patient Vitals for the past 24 hrs:   BP Temp Temp src Pulse Resp SpO2   01/15/22 2030 137/73 -- -- 80 -- 92 %   01/15/22 2000 129/70 -- -- 77 -- 93 %   01/15/22 1930 (!) 142/73 -- -- 84 -- 93 %   01/15/22 1900 137/79 -- -- 89 -- 92 %   01/15/22 1829 (!) 143/75 98.9  F (37.2  C) Temporal 88 16 92 %     Physical Exam   General:  Sitting on bed by self.   HENT:  No obvious trauma to head  Right Ear:  External ear normal.   Left Ear:  External ear normal.   Nose:  Nose normal.   Eyes:  Conjunctivae and EOM are normal. Pupils are equal, round, and reactive.   Neck: Normal range of motion. Neck supple. No tracheal deviation present.   CV:  Normal heart sounds. No murmur heard.  Pulm/Chest: Effort normal and breath sounds normal.   Abd: Soft. No distension. There is no tenderness. There is no rigidity, no rebound and no guarding.   M/S: Normal range of motion.   Neuro: Alert. GCS 15. CN II-XII Grossly intact, no pronator drift, normal finger-nose-finger, visual fields intact by confrontation. Muscle strength is +5 proximal and distal in the bilateral upper and lower extremities. No dysarthria. Normal palm up, palm down.  Horizontal nystagmus to the left.  Skin: Skin is warm and dry. No rash noted. Not diaphoretic.   Psych: Normal mood and affect. Behavior is normal.     Brief MDM:  Lawanda Crawford is a very pleasant 81 year old year old female who presents to the emergency department with concern of dizziness.  The patient did have a horizontal nystagmus to the left.  Basic labs and INR were obtained.  The patient had been seen for this before and had a CT of the head that was unremarkable.   There has been no trauma to warrant need for repeat CT imaging and hs has no headache of focal neuro deficit. Patient did not improve with the meclizine, fluids, potassium replacement or valium. Patient had a normal gait and no signs of dysdiadochokinesia, but given the lack of improvement with the interventions, we will obtain an MRI and admit the patient for continued evaluation and treatment. I spoke with Dr. Fitzgerald who has agreed to admit the patient.    My Impression and diagnosis:    ICD-10-CM    1. Dizziness  R42    2. Hypokalemia  E87.6          DO Jones Conley Robert James, DO  01/15/22 9295

## 2022-01-16 NOTE — PLAN OF CARE
PRIMARY DIAGNOSIS: GENERALIZED WEAKNESS    OUTPATIENT/OBSERVATION GOALS TO BE MET BEFORE DISCHARGE  1. Orthostatic performed: Yes:          Lying Orthostatic BP: 127/76         Sitting Orthostatic BP: 122/76         Standing Orthostatic BP: 110/68     2. Tolerating PO medications: Yes    3. Return to near baseline physical activity: Yes    4. Cleared for discharge by consultants (if involved): No    Discharge Planner Nurse   Safe discharge environment identified: Yes  Barriers to discharge: Yes       Entered by: Kim Aguilar 01/16/2022 8:14 AM     Please review provider order for any additional goals.   Nurse to notify provider when observation goals have been met and patient is ready for discharge.

## 2022-01-16 NOTE — PLAN OF CARE
PRIMARY DIAGNOSIS: GENERALIZED WEAKNESS     OUTPATIENT/OBSERVATION GOALS TO BE MET BEFORE DISCHARGE  1. Orthostatic performed: Yes:          Lying Orthostatic BP: 127/76         Sitting Orthostatic BP: 122/76         Standing Orthostatic BP: 110/68      2. Tolerating PO medications: Yes     3. Return to near baseline physical activity: Yes     4. Cleared for discharge by consultants (if involved): No     Discharge Planner Nurse   Safe discharge environment identified: Yes  Barriers to discharge: Yes         Please review provider order for any additional goals.   Nurse to notify provider when observation goals have been met and patient is ready for discharge.

## 2022-01-16 NOTE — ED NOTES
[facility]  ED Nurse Handoff Report    ED Chief complaint: Dizziness      ED Diagnosis:   Final diagnoses:   Dizziness   Hypokalemia       Code Status: Full Code    Allergies:   Allergies   Allergen Reactions     Ofloxacin      light headedness       Patient Story: Patient starting around 4-5pm today felt very dizzy even with sitting, pt decided to call EMS and came to ER.   Focused Assessment:  Pt continues to have dizziness with any change of position despite interventions of fluids and medication.    Treatments and/or interventions provided: IVF, meds, MRI  Patient's response to treatments and/or interventions: no changes, continues to be dizzy    To be done/followed up on inpatient unit:  MRI    Does this patient have any cognitive concerns?: None    Activity level - Baseline/Home:  Independent  Activity Level - Current:   Stand with Assist    Patient's Preferred language: English   Needed?: No    Isolation: None  Infection: Not Applicable  Patient tested for COVID 19 prior to admission: YES  Bariatric?: No    Vital Signs:   Vitals:    01/15/22 1900 01/15/22 1930 01/15/22 2000 01/15/22 2030   BP: 137/79 (!) 142/73 129/70 137/73   Pulse: 89 84 77 80   Resp:       Temp:       TempSrc:       SpO2: 92% 93% 93% 92%       Cardiac Rhythm:     Was the PSS-3 completed:   Yes  What interventions are required if any?               Family Comments: N/A  OBS brochure/video discussed/provided to patient/family: No              Name of person given brochure if not patient: Self              Relationship to patient: n/a    For the majority of the shift this patient's behavior was Green.   Behavioral interventions performed were none.    ED NURSE PHONE NUMBER: [id number]

## 2022-01-16 NOTE — PLAN OF CARE
RECEIVING UNIT ED HANDOFF REVIEW    ED Nurse Handoff Report was reviewed by: Saravanan Hinkle RN on January 16, 2022 at 12:43 AM

## 2022-01-16 NOTE — PLAN OF CARE
A/O x4. VSS on RA. Up with SBA. Regular diet. Continent. NS @75/hr. Tele: NSR. Ortho BP -ve. K+ replaced, last check: 3.5. MRI done last night, refer to results. Plan: PT consult for vestibular therapy.

## 2022-01-16 NOTE — ED PROVIDER NOTES
History     Chief Complaint:  Unsteadiness, weakness     [facility]   Lawanda Crawford is a 81 year old female with a PMH of HTN on amlodipine, HLD on simvastatin, PE on warfarin and depressive disorder on Lexapro who presents to the ED by EMS for evaluation of unsteadiness and generalized weakness. Per chart review, the patient presented here to the ED with the same complaint 12/15/21 and underwent thorough evaluation by Dr. Torres, including head CT EKG and Troponin, thyroid studies, urine, CBC, and metabolic panel, all of which were grossly unremarkable. The patient reports that this afternoon around 4pm (~3 hours prior to arrival here) she suddenly felt a generalized weakness throughout her body and unsteadiness. She states she laid down on her bed and called her daughter, as well as 911. EMS brought her into the ED and reported that she had a normal blood glucose. aLwanda denies any symptoms apart from the weakness and unsteadiness. No other neurologic symptoms including speech changes, facial droop, numbness/tingling, headache, visual disturbances. No chest pain, palpitations or shortness of breath. No abdominal pain, nausea, vomiting, diarrhea or blood in the stool. No urinary burning or urgency. No fever, sore throat or cough. The patient has no recent known exposure to illness. She mentions she received one Covid vaccination, but did not go forward with the second one or booster.    ROS:  A comprehensive ROS was obtained and is negative aside from those called out in the HPI above.    Allergies:  Ofloxacin     Medications:    Amlodipine  Lexapro  Simvastatin  Warfarin     Past Medical History:     Major depressive disorder  Osteoporosis  Hypercholesterolemia  Hypertension  PE  Refractory sinusitis     Past Surgical History:    Melanoma excision  Bilateral cataract extraction with intraocular lens implant  Sinus surgery      Family History:    Mother: hypertension     Social History:  The patient presents to  the ED by EMS.  She lives alone.  She does not drink ETOH.  She is a non-smoker.  She is a retired nurse. For 15 years, she was a .     Physical Exam     Patient Vitals for the past 24 hrs:   BP Temp Temp src Pulse Resp SpO2   01/16/22 0030 135/73 -- -- 78 -- 94 %   01/15/22 2359 -- -- -- -- -- 96 %   01/15/22 2358 127/77 -- -- 81 -- --   01/15/22 2030 137/73 -- -- 80 -- 92 %   01/15/22 2000 129/70 -- -- 77 -- 93 %   01/15/22 1930 (!) 142/73 -- -- 84 -- 93 %   01/15/22 1900 137/79 -- -- 89 -- 92 %   01/15/22 1829 (!) 143/75 98.9  F (37.2  C) Temporal 88 16 92 %        Physical Exam  General: Elderly female laying on [facility]. Appears comfortable. No distress.  HENT: Head atraumatic. No facial asymmetry. Patient wearing face mask. When taken off, mucous membranes appear moist.  Eyes: Wears glasses. Conjunctive and sclera clear. PERRLA. EOMs intact. On my initial exam, I did not  any nystagmus. However, when I reevaluated the patient with my attending physician she had a slightly leftward beating nystagmus in the left eye.  CV: Regular rate and rhythm. Normal S1, S2. No appreciable murmurs, gallops or rubs.  Resp: Lungs clear to auscultation bilaterally. Normal respiratory effort. Speaks in full sentences. No stridor or cough observed.  GI: Abdomen soft, non distended and nontender. No rebound or guarding.  MSK: No lower extremity edema.  Skin: Warm and dry.  Neuro: Awake, alert, oriented x3. GCS 15. Cranial nerves II through XII intact.  strength normal. Normal and equal strength in all extremities. No pronator drift.   Psych: Cooperative. Pleasant affect.      Emergency Department Course   ECG:  Performed at 1907. Read at 1908 by Dr. Goldman.  Vent rate 88. NH Interval 144ms. QRS duration 76ms. QT/QTc 376/454ms. PRT axes 23 53 3.  Normal sinus rhythm. Low voltage QRS.   Nonspecific T wave abnormality.  Abnormal ECG.   ECG is grossly unchanged from recent 12/15/21.    Imaging:  MR  Brain w/o Contrast   Final Result   IMPRESSION:     1.  No acute infarct.   2.  Age-related changes described above.   3.  Left parietal convexity small 5 mm extra-axial lesion nonspecific likely incidental meningioma. No significant mass effect on the adjacent brain parenchyma. No adjacent vasogenic edema.                  Laboratory:  Labs Ordered and Resulted from Time of ED Arrival to Time of ED Departure   INR - Abnormal       Result Value    INR 2.67 (*)    BASIC METABOLIC PANEL - Abnormal    Sodium 137      Potassium 3.3 (*)     Chloride 106      Carbon Dioxide (CO2) 25      Anion Gap 6      Urea Nitrogen 12      Creatinine 0.70      Calcium 8.3 (*)     Glucose 101 (*)     GFR Estimate 86     INFLUENZA A/B & SARS-COV2 PCR MULTIPLEX - Normal    Influenza A PCR Negative      Influenza B PCR Negative      SARS CoV2 PCR Negative     CBC WITH PLATELETS AND DIFFERENTIAL    WBC Count 6.1      RBC Count 4.32      Hemoglobin 12.3      Hematocrit 38.6      MCV 89      MCH 28.5      MCHC 31.9      RDW 14.1      Platelet Count 347      % Neutrophils 57      % Lymphocytes 26      % Monocytes 9      % Eosinophils 7      % Basophils 1      % Immature Granulocytes 0      NRBCs per 100 WBC 0      Absolute Neutrophils 3.5      Absolute Lymphocytes 1.6      Absolute Monocytes 0.5      Absolute Eosinophils 0.4      Absolute Basophils 0.1      Absolute Immature Granulocytes 0.0      Absolute NRBCs 0.0          Emergency Department Course:    Reviewed:  I reviewed nursing notes, vitals and past medical history    Assessments:  1900 I obtained history and examined the patient as noted above.     2020    My attending physician Dr. Goldman evaluated the patient and noted a slight horizontal nystagmus in the patient's left eye beating to the left. He ordered Meclizine and we discussed his suspicion for BPPV.    2221    I rechecked the patient after the Meclizine and Valium. She states her dizziness has persisted and she doesn't feel  improved at all from when she first presented.    My attending physician Dr. Goldman discussed the patient's case with Dr. Yash Regalado, who agreed to accept the patient for Observation overnight.    0040   The patient was still in the ED waiting for a room when her MRI results returned per above. I explained to her that there was no stroke, large mass or bleed on her imaging. We did discuss the small incidental meningioma and she understood it is benign, but should be followed over time. She will mention to her PCP.    Interventions:  Medications   0.9% sodium chloride BOLUS (0 mLs Intravenous Stopped 1/15/22 1954)   potassium chloride ER (KLOR-CON M) CR tablet 20 mEq (20 mEq Oral Given 1/15/22 2038)   meclizine (ANTIVERT) tablet 25 mg (25 mg Oral Given 1/15/22 2038)   diazepam (VALIUM) tablet 5 mg (5 mg Oral Given 1/15/22 2131)      Disposition:  The patient was admitted to the hospital for Observation  under the care of Dr. Yash Regalado.    Impression & Plan        Covid-19  Lawanda Crawford was evaluated during a global COVID-19 pandemic, which necessitated consideration that the patient might be at risk for infection with the SARS-CoV-2 virus that causes COVID-19.   Applicable protocols for evaluation were followed during the patient's care. COVID-19 was considered as part of the patient's evaluation and a test was ordered.    Medical Decision Making:  Lawanda is an 80yo female who presented tonight to the ED by EMS for a sudden onset of generalized weakness/unsteadiness per HPI above. Per chart review, the patient presented here to the ED with the same complaint 12/15/21 and underwent thorough evaluation by Dr. Torres, including head CT EKG and Troponin, thyroid studies, urine, CBC, and metabolic panel, all of which were grossly unremarkable.     EMS reported her blood sugar was normal by paramedics and her BMP did not show any hypoglycemia to explain her symptoms. Her BMP did show a mild hypokalemia and we  repleted her potassium here in the ED with oral potassium chloride. Since the patient is on Warfarin for past pulmonary embolism, I obtained an INR, which returned within therapeutic range and reassured me that another clot is unlikely. I also obtained an initial ECG, which was reassuring per above and she did not have concerning chest pain or other symptoms that made me suspicious for ACS. In light of the pandemic, I obtained a COVID-19 test, which also returned negative. CBC was also  unremarkable and no signs of low hemoglobin worrisome for anemia or leukocytosis concerning for infection.    We noted a leftward beating nystagmus in her left eye on reevaluation tonight, which points to more of a picture of benign proxismal positional vertigo. However, the patients symptoms did not resolve with the interventions above (Meclizine, Valium) and she felt too unsteady to go home safely. We discussed admitting her for observation and I ordered a brain MRI to investigate for a central etiology and that was pending at the time of admission. Her vitals remained stable through her course here and she appeared relatively comfortable while in the department. There were no red flag focal neurologic deficits on physical exam.    *Of note, before the patient was moved to a bed upstairs, her MRI returned per above with no worrisome findings such as infarct, bleed or large mass. We did discuss the incidental small meningioma per ED course above.    Diagnosis:    ICD-10-CM    1. Dizziness  R42    2. Hypokalemia  E87.6         Discharge Medications:  New Prescriptions    No medications on file        1/15/2022   Peggy PELAYOA APC-T  I saw and evaluated this patient under attending provider Dr. Ray Goldman.         Peggy Thorne PA-C  01/16/22 0058

## 2022-01-16 NOTE — ED NOTES
"Patient up to BSC with assistance of 1 person, states \"I am just spinning\" as soon as she stands up and moves.   "

## 2022-01-16 NOTE — PLAN OF CARE
PRIMARY DIAGNOSIS: GENERALIZED WEAKNESS     OUTPATIENT/OBSERVATION GOALS TO BE MET BEFORE DISCHARGE  1. Orthostatic performed: Yes:          Lying Orthostatic BP: 127/76         Sitting Orthostatic BP: 122/76         Standing Orthostatic BP: 110/68      2. Tolerating PO medications: Yes     3. Return to near baseline physical activity: Yes     4. Cleared for discharge by consultants (if involved): No     Discharge Planner Nurse   Safe discharge environment identified: Yes  Barriers to discharge: Yes       Entered by: Kim Aguilar 01/16/2022 8:14 AM  Please review provider order for any additional goals.   Nurse to notify provider when observation goals have been met and patient is ready for discharge.          A&Ox4. VSS. Ambulating SBA/ind. Reports mild dizziness, but improved from admission. Denies pain. Tolerating diet. Meclizine given x1. Plan for PT tomorrow.

## 2022-01-16 NOTE — PROGRESS NOTES
No therapist with vestibular training working today. Patient placed on schedule for tomorrow at 0815.

## 2022-01-17 ENCOUNTER — APPOINTMENT (OUTPATIENT)
Dept: PHYSICAL THERAPY | Facility: CLINIC | Age: 82
End: 2022-01-17
Attending: INTERNAL MEDICINE
Payer: MEDICARE

## 2022-01-17 VITALS
DIASTOLIC BLOOD PRESSURE: 67 MMHG | BODY MASS INDEX: 24.79 KG/M2 | HEART RATE: 83 BPM | TEMPERATURE: 97.5 F | HEIGHT: 65 IN | RESPIRATION RATE: 18 BRPM | OXYGEN SATURATION: 92 % | WEIGHT: 148.8 LBS | SYSTOLIC BLOOD PRESSURE: 110 MMHG

## 2022-01-17 LAB
ANION GAP SERPL CALCULATED.3IONS-SCNC: 6 MMOL/L (ref 3–14)
BUN SERPL-MCNC: 12 MG/DL (ref 7–30)
CALCIUM SERPL-MCNC: 8.5 MG/DL (ref 8.5–10.1)
CHLORIDE BLD-SCNC: 108 MMOL/L (ref 94–109)
CO2 SERPL-SCNC: 25 MMOL/L (ref 20–32)
CREAT SERPL-MCNC: 0.7 MG/DL (ref 0.52–1.04)
GFR SERPL CREATININE-BSD FRML MDRD: 86 ML/MIN/1.73M2
GLUCOSE BLD-MCNC: 91 MG/DL (ref 70–99)
INR PPP: 1.94 (ref 0.85–1.15)
POTASSIUM BLD-SCNC: 3.8 MMOL/L (ref 3.4–5.3)
SODIUM SERPL-SCNC: 139 MMOL/L (ref 133–144)

## 2022-01-17 PROCEDURE — 99217 PR OBSERVATION CARE DISCHARGE: CPT

## 2022-01-17 PROCEDURE — 97530 THERAPEUTIC ACTIVITIES: CPT | Mod: GP

## 2022-01-17 PROCEDURE — 250N000013 HC RX MED GY IP 250 OP 250 PS 637: Performed by: INTERNAL MEDICINE

## 2022-01-17 PROCEDURE — G0378 HOSPITAL OBSERVATION PER HR: HCPCS

## 2022-01-17 PROCEDURE — 97116 GAIT TRAINING THERAPY: CPT | Mod: GP

## 2022-01-17 PROCEDURE — 97161 PT EVAL LOW COMPLEX 20 MIN: CPT | Mod: GP

## 2022-01-17 PROCEDURE — 82310 ASSAY OF CALCIUM: CPT | Performed by: PHYSICIAN ASSISTANT

## 2022-01-17 PROCEDURE — 85610 PROTHROMBIN TIME: CPT | Performed by: PHYSICIAN ASSISTANT

## 2022-01-17 PROCEDURE — 97112 NEUROMUSCULAR REEDUCATION: CPT | Mod: GP

## 2022-01-17 PROCEDURE — 36415 COLL VENOUS BLD VENIPUNCTURE: CPT | Performed by: PHYSICIAN ASSISTANT

## 2022-01-17 RX ORDER — WARFARIN SODIUM 7.5 MG/1
7.5 TABLET ORAL
Status: DISCONTINUED | OUTPATIENT
Start: 2022-01-17 | End: 2022-01-17 | Stop reason: HOSPADM

## 2022-01-17 RX ADMIN — ESCITALOPRAM OXALATE 20 MG: 20 TABLET ORAL at 10:09

## 2022-01-17 RX ADMIN — AMLODIPINE BESYLATE 10 MG: 10 TABLET ORAL at 10:09

## 2022-01-17 NOTE — PLAN OF CARE
Physical Therapy Discharge Summary    Reason for therapy discharge:    All goals and outcomes met, no further needs identified.    Progress towards therapy goal(s). See goals on Care Plan in [facility] electronic health record for goal details.  Goals met    Therapy recommendation(s):    Continued therapy is recommended.  Rationale/Recommendations:  OP vestibular therapy if symptoms do not improve or worsen.

## 2022-01-17 NOTE — PLAN OF CARE
OUTPATIENT/OBSERVATION GOALS TO BE MET BEFORE DISCHARGE  1. Orthostatic performed: Yes:          Lying Orthostatic BP: 127/76         Sitting Orthostatic BP: 122/76         Standing Orthostatic BP: 110/68      2. Tolerating PO medications: Yes     3. Return to near baseline physical activity: Yes     4. Cleared for discharge by consultants (if involved): No      Nurse to notify provider when observation goals have been met and patient is ready for discharge.    A&Ox4. VSS. Denies pain. Reports dizziness has improved. PT consult complete. Plan to discharge today.

## 2022-01-17 NOTE — PROGRESS NOTES
01/17/22 0815   Quick Adds   Quick Adds Vestibular Eval   Type of Visit Initial PT Evaluation   Living Environment   People in home alone   Current Living Arrangements house  (one level townhouse)   Home Accessibility no concerns   Self-Care   Usual Activity Tolerance moderate   Current Activity Tolerance fair   Regular Exercise No   Equipment Currently Used at Home none   Disability/Function   Fall history within last six months no   General Information   Onset of Illness/Injury or Date of Surgery 01/15/22   Referring Physician Yash Hinton MD   Patient/Family Therapy Goals Statement (PT) Unsure if she can return home   Pertinent History of Current Problem (include personal factors and/or comorbidities that impact the POC) Pt admitted under observation status with dizziness, unsteady balance, hypokalemia, chest fluttering. PMH: recurrent PE, hypertension, depression.   Existing Precautions/Restrictions fall   Weight-Bearing Status - LLE full weight-bearing   Weight-Bearing Status - RLE full weight-bearing   Cognition   Orientation Status (Cognition) oriented x 4   Follows Commands (Cognition) WFL   Pain Assessment   Patient Currently in Pain Yes, see Vital Sign flowsheet  (L hip pain)   Posture    Posture Forward head position;Protracted shoulders   Range of Motion (ROM)   ROM Quick Adds ROM WFL   Strength   Manual Muscle Testing Quick Adds Strength WFL   Bed Mobility   Comment (Bed Mobility) Independent   Transfers   Transfer Safety Comments Independent   Gait/Stairs (Locomotion)   Comment (Gait/Stairs) Independent gait in room, slow but steady   Balance   Balance Comments Mild balance deficits, pt being more cautious than anything   Sensory Examination   Sensory Perception patient reports no sensory changes   Oculomotor Exam   Smooth Pursuit Normal   Saccades Normal   VOR Normal   Rapid Head Thrust Corrective Saccade L head thrust   Infrared Goggle Exam or Frenzel Lense Exam   Spontaneous Nystagmus Negative    Gaze Evoked Nystagmus Negative   Head Shake Horizontal Nystagmus Negative   Ghulam-Hallpike (Right) Negative   Ghulam-Hallpike (Left) Negative   HSCC Supine Roll Test (Right) Negative   HSCC Supine Roll Test (Left) Negative   Clinical Impression   Criteria for Skilled Therapeutic Intervention yes, treatment indicated   PT Diagnosis (PT) Dizziness   Influenced by the following impairments Dizziness, dec activity tolerance, balance   Functional limitations due to impairments Difficulty ambulating   Clinical Presentation Stable/Uncomplicated   Clinical Presentation Rationale medically improved   Clinical Decision Making (Complexity) low complexity   Therapy Frequency (PT) One time eval and treatment only   Predicted Duration of Therapy Intervention (days/wks) 1 day   Planned Therapy Interventions (PT) neuromuscular re-education;patient/family education;transfer training;gait training   Risk & Benefits of therapy have been explained evaluation/treatment results reviewed;care plan/treatment goals reviewed;risks/benefits reviewed;current/potential barriers reviewed;participants voiced agreement with care plan   PT Discharge Planning    PT Discharge Recommendation (DC Rec) home with outpatient physical therapy   PT Rationale for DC Rec Pt will be safe to return home alone with family check ins. If symptoms do not improve or worsen, then recommend OP vestibular therapy. Pt in agreement.   Total Evaluation Time   Total Evaluation Time (Minutes) 20

## 2022-01-17 NOTE — PLAN OF CARE
A/O x4. VSS on RA. Up \IND. Regular diet. Continent of B&B..No skin issues. K+ replaced, last check: 3.5. Plan: PT consult for vestibular therapy.

## 2022-01-17 NOTE — PLAN OF CARE
PRIMARY DIAGNOSIS: GENERALIZED WEAKNESS     OUTPATIENT/OBSERVATION GOALS TO BE MET BEFORE DISCHARGE  1. Orthostatic performed: Yes:          Lying Orthostatic BP: 127/76         Sitting Orthostatic BP: 122/76         Standing Orthostatic BP: 110/68      2. Tolerating PO medications: Yes     3. Return to near baseline physical activity: Yes     4. Cleared for discharge by consultants (if involved): No      Nurse to notify provider when observation goals have been met and patient is ready for discharge.

## 2022-01-17 NOTE — DISCHARGE SUMMARY
[facility]  Hospitalist Discharge Summary      Date of Admission:  1/15/2022  Date of Discharge:  1/17/2022  Discharging Provider: CHRISTINE Gilman CNP      Discharge Diagnoses    Vertigo  Generalized Weakness      Follow-ups Needed After Discharge   Follow-up Appointments    Follow-up and recommended labs and tests      Follow up with anticoagulation clinic within 2 days to recheck INR.     Follow up with primary care provider within 7 days for hospital follow-   Up and incidental meningioma follow up.         Discharge Disposition   Discharged to home  Condition at discharge: Stable      Hospital Course   Lawanda Crawford is a 81 year old year old female who has a PMH significant for HTN on amlodipine, HLD on simvastatin, PE on warfarin and depressive disorder on Lexapro. Pt was admitted to [facility] on 1/15/2022 after presenting with complaints of unsteadiness and vertigo.  The following problems were addressed during her hospitalization:     Assessment & Plan:  Suspected vertigo  Has had similar sx in past. Was in ED 12/15 for same, CT head negative at time, TSH normal. Sx lasted less than a day. Then  ~3 hours PTA with sudden onset of weakness and unsteadiness again. Laid down in bed and called EMS. Denies other neuro sx. K 3.3 otherwise labs normal. Exam with horizontal nystagmus beating to L. MRI without acute infarct, incidental 5mm meningioma seen without mass effect or edema. Orthostatics negative 1/16. Did not require any PRN medications for dizziness this admission. PT did evaluate patient and recommends outpatient vestibular therapy.  - Referral for outpation vestibular therapy    Meningioma  Found incidental 5 mm meningioma seen without mass effect or edema. Given the patient does not have a history of cancer and her vertigo is likely not being caused by the meningeoma it is recommended to complete a follow up MRI within 3-6 months. Patient should  follow up with her PCP for further workup of this.      Hypokalemia   Has resolved, K= today was 3.6.      Hx recurrent PE  INR this morning 1.94. Will continue home Warfarin dosing, and recommend follow up INR in 2 days.     Hypertension  HLD  Continue PTA Amlodipine 10 mg daily, and Simvastatin 20 mg at HS       Depression  Continue PTA Escitalopram 20 mg daily     COVID-19 negative    Consultations This Hospital Stay   PHYSICAL THERAPY ADULT IP CONSULT  PHARMACY TO DOSE WARFARIN    Code Status   No CPR- Do NOT Intubate    Time Spent on this Encounter   I, CHRISTINE Gilman CNP, personally saw the patient today and spent greater than 30 minutes discharging this patient.       CHRISTINE iGlman CNP  [facility] EXTENDED RECOVERY AND SHORT STAY  [address] [address]  Phone: [phone]  ______________________________________________________________________    Physical Exam   Vital Signs: Temp: (!) 95.5  F (35.3  C) Temp src: Oral BP: 131/78 Pulse: 84   Resp: 18 SpO2: 94 % O2 Device: None (Room air)    Weight: 148 lbs 12.8 oz  Physical Exam  Constitutional:       General: She is not in acute distress.     Appearance: Normal appearance. She is not toxic-appearing.   HENT:      Head: Normocephalic and atraumatic.      Mouth/Throat:      Mouth: Mucous membranes are moist.      Pharynx: Oropharynx is clear.   Eyes:      Pupils: Pupils are equal, round, and reactive to light.   Cardiovascular:      Rate and Rhythm: Normal rate and regular rhythm.      Pulses: Normal pulses.      Heart sounds: No murmur heard.  No friction rub. No gallop.    Pulmonary:      Effort: Pulmonary effort is normal. No respiratory distress.      Breath sounds: No stridor. No wheezing or rhonchi.   Chest:      Chest wall: No tenderness.   Abdominal:      General: Abdomen is flat. Bowel sounds are normal. There is no distension.      Palpations: Abdomen is soft.      Tenderness: There is no abdominal  tenderness. There is no guarding.   Musculoskeletal:         General: No swelling or tenderness. Normal range of motion.      Right lower leg: No edema.      Left lower leg: No edema.   Skin:     General: Skin is warm and dry.      Capillary Refill: Capillary refill takes less than 2 seconds.      Findings: No bruising, erythema, lesion or rash.   Neurological:      General: No focal deficit present.      Mental Status: She is alert and oriented to person, place, and time.      Cranial Nerves: No cranial nerve deficit.      Sensory: No sensory deficit.   Psychiatric:         Mood and Affect: Mood normal.         Thought Content: Thought content normal.         Judgment: Judgment normal.              Primary Care Physician   Physician No Ref-Primary    Discharge Orders      Physical Therapy Referral      Reason for your hospital stay    Futher evaluation and management of unsteadiness/dizziness.     Follow-up and recommended labs and tests     Follow up with anticoagulation clinic within 2 days to recheck INR.     Follow up with primary care provider within 7 days for hospital follow- up.     Activity    Your activity upon discharge: activity as tolerated and no driving for today.     Diet    Follow this diet upon discharge: Regular Diet Adult       Significant Results and Procedures   Results for orders placed or performed during the hospital encounter of 01/15/22   MR Brain w/o Contrast    Narrative    EXAM: MR BRAIN W/O CONTRAST  LOCATION: [facility]  DATE/TIME: 1/15/2022 11:29 PM    INDICATION: Dizziness, non-specific  COMPARISON: None.  TECHNIQUE: Routine multiplanar multisequence head MRI without intravenous contrast.    FINDINGS:  INTRACRANIAL CONTENTS: No acute or subacute infarct. No large mass, acute hemorrhage, or extra-axial fluid collections. Patchy and confluent nonspecific T2/FLAIR hyperintensities within the cerebral white matter most consistent with moderate chronic    microvascular ischemic change. Mild generalized cerebral atrophy. No hydrocephalus. Normal position of the cerebellar tonsils. Left parietal convexity small 5 mm extra-axial lesion nonspecific likely incidental meningioma. No significant mass effect on   the adjacent brain parenchyma. No adjacent vasogenic edema.    SELLA: No abnormality accounting for technique.    OSSEOUS STRUCTURES/SOFT TISSUES: Normal marrow signal. The major intracranial vascular flow voids are maintained.     ORBITS: No abnormality accounting for technique.     SINUSES/MASTOIDS: Mild mucosal thickening scattered about the paranasal sinuses. No middle ear or mastoid effusion.       Impression    IMPRESSION:    1.  No acute infarct.  2.  Age-related changes described above.  3.  Left parietal convexity small 5 mm extra-axial lesion nonspecific likely incidental meningioma. No significant mass effect on the adjacent brain parenchyma. No adjacent vasogenic edema.               Discharge Medications   Current Discharge Medication List      CONTINUE these medications which have NOT CHANGED    Details   amLODIPine (NORVASC) 10 MG tablet Take 10 mg by mouth daily      escitalopram (LEXAPRO) 20 MG tablet Take 20 mg by mouth daily      simvastatin (ZOCOR) 20 MG tablet Take 20 mg by mouth At Bedtime      vitamin D3 (CHOLECALCIFEROL) 50 mcg (2000 units) tablet Take 1 tablet by mouth daily      warfarin ANTICOAGULANT (COUMADIN) 5 MG tablet Take 5 mg (5 mg x 1 TABLET) BY MOUTH every Sun, Tue, Thu; AND TAKE 7.5 mg (5 mg x 1.5 TABLETS) all other days OR as directed by INR clinic.           Allergies   Allergies   Allergen Reactions     Ofloxacin      light headedness

## 2022-01-17 NOTE — PHARMACY-ANTICOAGULATION SERVICE
Clinical Pharmacy - Warfarin Dosing Consult     Pharmacy has been consulted to manage this patient s warfarin therapy.  Indication: DVT/ PE Treatment  Therapy Goal: INR 2-3  Warfarin Prior to Admission: Yes  Warfarin PTA Regimen: 5 mg Sun/Tues/Thurs, 7.5 mg all other days  Recent documented change in oral intake/nutrition: Unknown  Dose Comments: 5 mg    INR   Date Value Ref Range Status   01/16/2022 2.45 (H) 0.85 - 1.15 Final   01/15/2022 2.67 (H) 0.85 - 1.15 Final       Recommend warfarin 5 mg today.  Pharmacy will monitor Lawanda Crawford daily and order warfarin doses to achieve specified goal.      Please contact pharmacy as soon as possible if the warfarin needs to be held for a procedure or if the warfarin goals change.      Brittny Marinelli, PharmD, BCPS

## 2022-01-17 NOTE — PLAN OF CARE
A/O x4. VSS on RA. Up with SBA. Regular diet. Continent. PIV SL. Tele: NSR. Denies pain. Dizziness resolving.   Plan: PT consult for vestibular therapy @ 08:15 today.

## 2022-01-18 ENCOUNTER — PATIENT OUTREACH (OUTPATIENT)
Dept: CARE COORDINATION | Facility: CLINIC | Age: 82
End: 2022-01-18
Payer: MEDICARE

## 2022-01-18 DIAGNOSIS — Z71.89 OTHER SPECIFIED COUNSELING: ICD-10-CM

## 2022-01-18 NOTE — PROGRESS NOTES
Clinic Care Coordination Contact  [facility]/Voicemail    Clinical Data: Care Coordinator Outreach  Outreach attempted x 1. Left message on patient's voicemail with call back information and requested return call.     Plan:Care Coordinator will try to reach patient again in 1-2 business days.    MIGEL Sams  [phone]  [facility]

## 2022-01-19 NOTE — PROGRESS NOTES
Clinic Care Coordination Contact  [facility]/Voicemail       Clinical Data: Care Coordinator Outreach  Outreach attempted x 2.  Left message on patient's voicemail with call back information and requested return call.  Plan: Care Coordinator will do no further outreaches at this time.    Melany Duncan  Community Health Worker  [facility]  Ph:[phone]

## 2022-04-26 NOTE — PHARMACY-ADMISSION MEDICATION HISTORY
Pharmacy Medication History  Admission medication history interview status for the 1/15/2022  admission is complete. See EPIC admission navigator for prior to admission medications     Location of Interview: Patient room  Medication history sources: Patient and Surescripts    Significant changes made to the medication list:  Added all meds to list     In the past week, patient estimated taking medication this percent of the time: greater than 90%    Additional medication history information:   None    Medication reconciliation completed by provider prior to medication history? No    Time spent in this activity: 10 min     Prior to Admission medications    Medication Sig Last Dose Taking? Auth Provider   amLODIPine (NORVASC) 10 MG tablet Take 10 mg by mouth daily 1/15/2022 at am Yes Unknown, Entered By History   escitalopram (LEXAPRO) 20 MG tablet Take 20 mg by mouth daily 1/15/2022 at am Yes Unknown, Entered By History   simvastatin (ZOCOR) 20 MG tablet Take 20 mg by mouth At Bedtime 1/14/2022 at pm Yes Unknown, Entered By History   vitamin D3 (CHOLECALCIFEROL) 50 mcg (2000 units) tablet Take 1 tablet by mouth daily 1/15/2022 at am Yes Unknown, Entered By History   warfarin ANTICOAGULANT (COUMADIN) 5 MG tablet Take 5 mg (5 mg x 1 TABLET) BY MOUTH every Sun, Tue, Thu; AND TAKE 7.5 mg (5 mg x 1.5 TABLETS) all other days OR as directed by INR clinic. 1/14/2022 at pm x7.5 mg Yes Unknown, Entered By History       The information provided in this note is only as accurate as the sources available at the time of update(s)     
Home

## 2022-11-22 ENCOUNTER — APPOINTMENT (OUTPATIENT)
Dept: ULTRASOUND IMAGING | Facility: CLINIC | Age: 82
End: 2022-11-22
Attending: EMERGENCY MEDICINE
Payer: MEDICARE

## 2022-11-22 ENCOUNTER — HOSPITAL ENCOUNTER (EMERGENCY)
Facility: CLINIC | Age: 82
Discharge: HOME | End: 2022-11-22
Attending: PHYSICIAN ASSISTANT | Admitting: PHYSICIAN ASSISTANT
Payer: MEDICARE

## 2022-11-22 VITALS
WEIGHT: 148 LBS | SYSTOLIC BLOOD PRESSURE: 140 MMHG | RESPIRATION RATE: 16 BRPM | DIASTOLIC BLOOD PRESSURE: 75 MMHG | HEART RATE: 91 BPM | BODY MASS INDEX: 24.63 KG/M2 | TEMPERATURE: 97.8 F | OXYGEN SATURATION: 96 %

## 2022-11-22 DIAGNOSIS — M71.22 BAKER'S CYST OF KNEE, LEFT: ICD-10-CM

## 2022-11-22 DIAGNOSIS — M79.652 PAIN OF LEFT THIGH: ICD-10-CM

## 2022-11-22 LAB — INR PPP: 2.05 (ref 0.85–1.15)

## 2022-11-22 PROCEDURE — 93971 EXTREMITY STUDY: CPT | Mod: LT

## 2022-11-22 PROCEDURE — 85610 PROTHROMBIN TIME: CPT | Performed by: PHYSICIAN ASSISTANT

## 2022-11-22 PROCEDURE — 36415 COLL VENOUS BLD VENIPUNCTURE: CPT | Performed by: PHYSICIAN ASSISTANT

## 2022-11-22 PROCEDURE — 99284 EMERGENCY DEPT VISIT MOD MDM: CPT | Mod: 25

## 2022-11-22 ASSESSMENT — ACTIVITIES OF DAILY LIVING (ADL): ADLS_ACUITY_SCORE: 35

## 2022-11-22 ASSESSMENT — ENCOUNTER SYMPTOMS
COLOR CHANGE: 0
SHORTNESS OF BREATH: 0
JOINT SWELLING: 0
WEAKNESS: 0
NUMBNESS: 0

## 2022-11-22 NOTE — ED TRIAGE NOTES
Pt arrived via ambulance from home where she lives independently. Left hamstring pain x2 weeks. Today while walking in the kitchen she noticed the pain was worse. She has history of a DVT and takes warfarin for it. Pt reported heat pack applied en route helped with the pain.      Triage Assessment     Row Name 11/22/22 1201       Triage Assessment (Adult)    Airway WDL WDL       Respiratory WDL    Respiratory WDL WDL       Skin Circulation/Temperature WDL    Skin Circulation/Temperature WDL WDL       Cardiac WDL    Cardiac WDL WDL

## 2022-11-22 NOTE — ED TRIAGE NOTES
Left leg pain for a while, now worse. Pain to back of left thigh. Hx of PE, takes coumadin.     Triage Assessment     Row Name 11/22/22 1222       Triage Assessment (Adult)    Airway WDL WDL       Respiratory WDL    Respiratory WDL WDL       Skin Circulation/Temperature WDL    Skin Circulation/Temperature WDL WDL       Cardiac WDL    Cardiac WDL WDL       Peripheral/Neurovascular WDL    Peripheral Neurovascular WDL WDL       Cognitive/Neuro/Behavioral WDL    Cognitive/Neuro/Behavioral WDL WDL    Row Name 11/22/22 1201       Triage Assessment (Adult)    Airway WDL WDL       Respiratory WDL    Respiratory WDL WDL       Skin Circulation/Temperature WDL    Skin Circulation/Temperature WDL WDL       Cardiac WDL    Cardiac WDL WDL

## 2022-11-22 NOTE — ED PROVIDER NOTES
History     Chief Complaint:  Leg Pain       HPI   Lawanda Crawford is a 81 year old female who presents  to the emergency department with her daughter.  She notes for the last week she has had some minor pain behind her left thigh.  Today the pain has gotten worse.  She is concerned she may have a blood clot because the last 1-2 INR checks have been below her therapeutic level recently and they have increased her dosages of Coumadin.  Patient thinks she takes right now alternating between 5 mg and 7.5 mg that she is unsure.  Based on her last anticoagulation therapy note from 11/16/2022 she has taken Ultracaine doses of 5 mg and 7.5 mg every day since 16 November.  With the goal INR being between 2-3.  Patient denies any chest pain or shortness of breath.  She has had prior blood clots in the lungs.  Denies any swelling.  She has had no recent surgery or immobilization.  She denies any trauma to her upper leg or hip.  No numbness or distal weakness.     ROS:  Review of Systems   Respiratory: Negative for shortness of breath.    Cardiovascular: Negative for chest pain.   Musculoskeletal: Negative for joint swelling.        Left posterior thigh pain +   Skin: Negative for color change and pallor.   Neurological: Negative for weakness and numbness.   All other systems reviewed and are negative.    Allergies:  Ofloxacin     Medications:    amLODIPine (NORVASC) 10 MG tablet  escitalopram (LEXAPRO) 20 MG tablet  simvastatin (ZOCOR) 20 MG tablet  vitamin D3 (CHOLECALCIFEROL) 50 mcg (2000 units) tablet  warfarin ANTICOAGULANT (COUMADIN) 5 MG tablet        Past Medical History:    Hx of PE  HTN  Chronic Anticoagulation with coumadin  Hypercholesterolemia  Major Depressive Disorder  Osteoporosis    Past Surgical History:    No pertinent surgical hx    Social History:  Presents to the ED with with her daughter    Physical Exam   No data found.     Physical Exam  Vitals and nursing note reviewed.   Constitutional:        Appearance: Normal appearance. She is not ill-appearing.   Eyes:      General: No scleral icterus.  Cardiovascular:      Rate and Rhythm: Normal rate and regular rhythm.      Pulses: Normal pulses.      Heart sounds: Normal heart sounds. No murmur heard.    No friction rub. No gallop.   Pulmonary:      Effort: Pulmonary effort is normal. No respiratory distress.      Breath sounds: Normal breath sounds. No stridor. No wheezing, rhonchi or rales.   Musculoskeletal:      Left hip: Normal. No tenderness or bony tenderness.      Left upper leg: Tenderness present. No swelling, edema, deformity or bony tenderness.      Left knee: Normal. No swelling, deformity, effusion, erythema, ecchymosis, bony tenderness or crepitus. Normal range of motion. No tenderness.      Left lower leg: Normal. No swelling, tenderness or bony tenderness. No edema.      Left ankle: Normal. Normal range of motion. Normal pulse.      Left foot: Normal range of motion and normal capillary refill. Normal pulse.        Legs:    Skin:     General: Skin is warm.      Capillary Refill: Capillary refill takes less than 2 seconds.      Coloration: Skin is not jaundiced or pale.      Findings: No bruising (Slight bruise to anterior left thigh ( Patient reports chronic from her dog)).   Neurological:      General: No focal deficit present.      Mental Status: She is oriented to person, place, and time. Mental status is at baseline.      Sensory: No sensory deficit.      Motor: No weakness.   Psychiatric:         Behavior: Behavior normal.         Thought Content: Thought content normal.         Emergency Department Course     Imaging:  US Lower Extremity Venous Duplex Left   Final Result   IMPRESSION:    1.  LEFT LEG: Negative for deep vein thrombosis.   2.  Baker's cyst in the left popliteal fossa.      TIBURCIO ODONNELL MD            SYSTEM ID:  [id number]         Report per radiology    Laboratory:  Labs Ordered and Resulted from Time of ED Arrival to Time of  ED Departure   INR - Abnormal       Result Value    INR 2.05 (*)         Procedures       Emergency Department Course:             Reviewed:  I reviewed nursing notes, vitals and past medical history    Assessments:   I obtained history and examined the patient as noted above.    I rechecked the patient and explained findings with the patient and her daughter.       Consults:       Interventions:  Medications - No data to display     Disposition:  The patient was discharged to home.     Impression & Plan    CMS Diagnoses: None          Medical Decision Making:  This is an 81-year-old female who presents to the emergency department with a cute onset of posterior left thigh pain consistent which concerns her for potential blood clot.  She has had a history of PE in the past.  She denies any chest pain or shortness of breath.  There is no swelling or erythema of her leg.  She has a minor bruise that she obtained weeks ago otherwise no recent trauma.  Ultrasound imaging of the leg was negative for DVT.  INR was checked and she is within therapeutic range.  I think is very unlikely at this time there is an occult blood clot.  Recommend anti-inflammatory such as Tylenol and heat follow-up with primary care next week if pain persist.  Could consider repeat ultrasound in her leg in 1 week to ensure no developing a blood clot however again given that she is currently therapeutic on her INR I think this is less likely.  Return back to emergency department if pain worsens she develops any increase in swelling or redness or numbness or worsening condition of her left lower extremity.        Diagnosis:    ICD-10-CM    1. Baker's cyst of knee, left  M71.22       2. Pain of left thigh  M79.652            Discharge Medications:  Discharge Medication List as of 11/22/2022  3:11 PM           11/22/2022   Rey Loomis PA-C Kruger, Jacob C, PA-C  11/23/22 8810

## 2023-05-24 ENCOUNTER — HOSPITAL ENCOUNTER (EMERGENCY)
Facility: CLINIC | Age: 83
Discharge: HOME | End: 2023-05-24
Attending: EMERGENCY MEDICINE | Admitting: EMERGENCY MEDICINE
Payer: MEDICARE

## 2023-05-24 VITALS
OXYGEN SATURATION: 94 % | DIASTOLIC BLOOD PRESSURE: 73 MMHG | HEART RATE: 72 BPM | TEMPERATURE: 96.8 F | SYSTOLIC BLOOD PRESSURE: 130 MMHG | RESPIRATION RATE: 16 BRPM | WEIGHT: 140 LBS | BODY MASS INDEX: 26.43 KG/M2 | HEIGHT: 61 IN

## 2023-05-24 DIAGNOSIS — M62.81 GENERALIZED MUSCLE WEAKNESS: ICD-10-CM

## 2023-05-24 LAB
ALBUMIN SERPL BCG-MCNC: 4 G/DL (ref 3.5–5.2)
ALBUMIN UR-MCNC: NEGATIVE MG/DL
ALP SERPL-CCNC: 103 U/L (ref 35–104)
ALT SERPL W P-5'-P-CCNC: 23 U/L (ref 10–35)
ANION GAP SERPL CALCULATED.3IONS-SCNC: 12 MMOL/L (ref 7–15)
APPEARANCE UR: CLEAR
AST SERPL W P-5'-P-CCNC: 23 U/L (ref 10–35)
ATRIAL RATE - MUSE: 89 BPM
BASOPHILS # BLD AUTO: 0.1 10E3/UL (ref 0–0.2)
BASOPHILS NFR BLD AUTO: 1 %
BILIRUB SERPL-MCNC: 0.2 MG/DL
BILIRUB UR QL STRIP: NEGATIVE
BUN SERPL-MCNC: 12.4 MG/DL (ref 8–23)
CALCIUM SERPL-MCNC: 9.3 MG/DL (ref 8.8–10.2)
CHLORIDE SERPL-SCNC: 100 MMOL/L (ref 98–107)
COLOR UR AUTO: ABNORMAL
CREAT SERPL-MCNC: 0.73 MG/DL (ref 0.51–0.95)
DEPRECATED HCO3 PLAS-SCNC: 22 MMOL/L (ref 22–29)
DIASTOLIC BLOOD PRESSURE - MUSE: NORMAL MMHG
EOSINOPHIL # BLD AUTO: 0.2 10E3/UL (ref 0–0.7)
EOSINOPHIL NFR BLD AUTO: 2 %
ERYTHROCYTE [DISTWIDTH] IN BLOOD BY AUTOMATED COUNT: 15.3 % (ref 10–15)
FLUAV RNA SPEC QL NAA+PROBE: NEGATIVE
FLUBV RNA RESP QL NAA+PROBE: NEGATIVE
GFR SERPL CREATININE-BSD FRML MDRD: 82 ML/MIN/1.73M2
GLUCOSE SERPL-MCNC: 153 MG/DL (ref 70–99)
GLUCOSE UR STRIP-MCNC: NEGATIVE MG/DL
HCT VFR BLD AUTO: 38.7 % (ref 35–47)
HGB BLD-MCNC: 12.4 G/DL (ref 11.7–15.7)
HGB UR QL STRIP: NEGATIVE
IMM GRANULOCYTES # BLD: 0.1 10E3/UL
IMM GRANULOCYTES NFR BLD: 1 %
INR PPP: 2.43 (ref 0.85–1.15)
INTERPRETATION ECG - MUSE: NORMAL
KETONES UR STRIP-MCNC: NEGATIVE MG/DL
LEUKOCYTE ESTERASE UR QL STRIP: NEGATIVE
LYMPHOCYTES # BLD AUTO: 1.3 10E3/UL (ref 0.8–5.3)
LYMPHOCYTES NFR BLD AUTO: 15 %
MCH RBC QN AUTO: 27 PG (ref 26.5–33)
MCHC RBC AUTO-ENTMCNC: 32 G/DL (ref 31.5–36.5)
MCV RBC AUTO: 84 FL (ref 78–100)
MONOCYTES # BLD AUTO: 0.5 10E3/UL (ref 0–1.3)
MONOCYTES NFR BLD AUTO: 6 %
MUCOUS THREADS #/AREA URNS LPF: PRESENT /LPF
NEUTROPHILS # BLD AUTO: 6.7 10E3/UL (ref 1.6–8.3)
NEUTROPHILS NFR BLD AUTO: 75 %
NITRATE UR QL: NEGATIVE
NRBC # BLD AUTO: 0 10E3/UL
NRBC BLD AUTO-RTO: 0 /100
P AXIS - MUSE: 34 DEGREES
PH UR STRIP: 6 [PH] (ref 5–7)
PLATELET # BLD AUTO: 415 10E3/UL (ref 150–450)
POTASSIUM SERPL-SCNC: 4 MMOL/L (ref 3.4–5.3)
PR INTERVAL - MUSE: 138 MS
PROT SERPL-MCNC: 7.3 G/DL (ref 6.4–8.3)
QRS DURATION - MUSE: 74 MS
QT - MUSE: 388 MS
QTC - MUSE: 472 MS
R AXIS - MUSE: 72 DEGREES
RBC # BLD AUTO: 4.6 10E6/UL (ref 3.8–5.2)
RBC URINE: <1 /HPF
RSV RNA SPEC NAA+PROBE: NEGATIVE
SARS-COV-2 RNA RESP QL NAA+PROBE: NEGATIVE
SODIUM SERPL-SCNC: 134 MMOL/L (ref 136–145)
SP GR UR STRIP: 1.02 (ref 1–1.03)
SYSTOLIC BLOOD PRESSURE - MUSE: NORMAL MMHG
T AXIS - MUSE: 31 DEGREES
TROPONIN T SERPL HS-MCNC: <6 NG/L
UROBILINOGEN UR STRIP-MCNC: NORMAL MG/DL
VENTRICULAR RATE- MUSE: 89 BPM
WBC # BLD AUTO: 8.8 10E3/UL (ref 4–11)
WBC URINE: 1 /HPF

## 2023-05-24 PROCEDURE — 81003 URINALYSIS AUTO W/O SCOPE: CPT | Performed by: EMERGENCY MEDICINE

## 2023-05-24 PROCEDURE — 85610 PROTHROMBIN TIME: CPT | Performed by: EMERGENCY MEDICINE

## 2023-05-24 PROCEDURE — 93005 ELECTROCARDIOGRAM TRACING: CPT

## 2023-05-24 PROCEDURE — 87637 SARSCOV2&INF A&B&RSV AMP PRB: CPT | Performed by: EMERGENCY MEDICINE

## 2023-05-24 PROCEDURE — C9803 HOPD COVID-19 SPEC COLLECT: HCPCS

## 2023-05-24 PROCEDURE — 99284 EMERGENCY DEPT VISIT MOD MDM: CPT | Mod: 25

## 2023-05-24 PROCEDURE — 258N000003 HC RX IP 258 OP 636: Performed by: EMERGENCY MEDICINE

## 2023-05-24 PROCEDURE — 80053 COMPREHEN METABOLIC PANEL: CPT | Performed by: EMERGENCY MEDICINE

## 2023-05-24 PROCEDURE — 96360 HYDRATION IV INFUSION INIT: CPT

## 2023-05-24 PROCEDURE — 85025 COMPLETE CBC W/AUTO DIFF WBC: CPT | Performed by: EMERGENCY MEDICINE

## 2023-05-24 PROCEDURE — 84484 ASSAY OF TROPONIN QUANT: CPT | Performed by: EMERGENCY MEDICINE

## 2023-05-24 PROCEDURE — 36415 COLL VENOUS BLD VENIPUNCTURE: CPT | Performed by: EMERGENCY MEDICINE

## 2023-05-24 RX ADMIN — SODIUM CHLORIDE 1000 ML: 9 INJECTION, SOLUTION INTRAVENOUS at 19:41

## 2023-05-24 ASSESSMENT — ACTIVITIES OF DAILY LIVING (ADL)
ADLS_ACUITY_SCORE: 35
ADLS_ACUITY_SCORE: 35

## 2023-05-24 NOTE — ED PROVIDER NOTES
"    History     Chief Complaint:  Generalized Weakness       The history is provided by the patient.      Lawanda Crawford is a 82 year old female, anticoagulated on Coumadin, with a history of pulmonary embolism, hypertension, and hypercholesteremia who presents with generalized weakness. Earlier this morning, she suddenly developed generalized malaise and fatigue after taking her dog for a walk for the second time today. She continues to have symptoms since then, and is not familiar with then. She denies recent falls or trauma to the head. Her oral intake continues to be good. She denies fever, chills, vomiting, diarrhea, chest pain, diaphoresis, abdominal pain, dysuria, speech difficulty, of vision changes.     Independent Historian:    None     Review of External Notes:  None       Medications:    Norvasc   Lexapro   Zocor   Coumadin     Past Medical History:    Major depressive disorder   Osteoporosis   Pure hypercholesteremia   Essential HTN   PE    Past Surgical History:    Melanoma excision   B/L cataract extraction w/ intraocular lens insertion     Physical Exam     Patient Vitals for the past 24 hrs:   BP Temp Temp src Pulse Resp SpO2 Height Weight   05/24/23 1921 134/66 -- -- 76 19 94 % -- --   05/24/23 1630 (!) 151/64 96.8  F (36  C) Temporal 89 18 96 % 1.549 m (5' 1\") 63.5 kg (140 lb)        Physical Exam  Constitutional: Well appearing.  HEENT: Atraumatic.  PERRL.  EOMI.  Moist mucous membranes.  Neck: Soft.  Supple.  No JVD.  Cardiac: Regular rate and rhythm.  No murmur or rub.  Respiratory: Clear to auscultation bilaterally.  No respiratory distress.  No wheezing, rhonchi, or rales.  Abdomen: Soft and nontender.  No rebound or guarding.  Nondistended.  Musculoskeletal: No edema.  Normal range of motion.  Neurologic: Alert and oriented x3.  Normal tone and bulk.  5/5 strength in bilateral upper and lower extremities.  Sensation to light touch intact throughout.  Normal gait.  Skin: No rashes.  No " edema.  Psych: Normal affect.  Normal behavior.        Emergency Department Course   ECG  ECG taken at 1630, ECG read at 1803  Normal sinus rhythm   Nonspecific T wave abnormality   Prolonged QT   Abnormal ECG   Rate 89 bpm. OH interval 138 ms. QRS duration 74 ms. QT/QTc 388/472 ms. P-R-T axes 34 72 31.    Laboratory:  Labs Ordered and Resulted from Time of ED Arrival to Time of ED Departure   INR - Abnormal       Result Value    INR 2.43 (*)    COMPREHENSIVE METABOLIC PANEL - Abnormal    Sodium 134 (*)     Potassium 4.0      Chloride 100      Carbon Dioxide (CO2) 22      Anion Gap 12      Urea Nitrogen 12.4      Creatinine 0.73      Calcium 9.3      Glucose 153 (*)     Alkaline Phosphatase 103      AST 23      ALT 23      Protein Total 7.3      Albumin 4.0      Bilirubin Total 0.2      GFR Estimate 82     ROUTINE UA WITH MICROSCOPIC REFLEX TO CULTURE - Abnormal    Color Urine Light Yellow      Appearance Urine Clear      Glucose Urine Negative      Bilirubin Urine Negative      Ketones Urine Negative      Specific Gravity Urine 1.016      Blood Urine Negative      pH Urine 6.0      Protein Albumin Urine Negative      Urobilinogen Urine Normal      Nitrite Urine Negative      Leukocyte Esterase Urine Negative      Mucus Urine Present (*)     RBC Urine <1      WBC Urine 1     CBC WITH PLATELETS AND DIFFERENTIAL - Abnormal    WBC Count 8.8      RBC Count 4.60      Hemoglobin 12.4      Hematocrit 38.7      MCV 84      MCH 27.0      MCHC 32.0      RDW 15.3 (*)     Platelet Count 415      % Neutrophils 75      % Lymphocytes 15      % Monocytes 6      % Eosinophils 2      % Basophils 1      % Immature Granulocytes 1      NRBCs per 100 WBC 0      Absolute Neutrophils 6.7      Absolute Lymphocytes 1.3      Absolute Monocytes 0.5      Absolute Eosinophils 0.2      Absolute Basophils 0.1      Absolute Immature Granulocytes 0.1      Absolute NRBCs 0.0     INFLUENZA A/B, RSV, & SARS-COV2 PCR - Normal    Influenza A PCR Negative       Influenza B PCR Negative      RSV PCR Negative      SARS CoV2 PCR Negative     TROPONIN T, HIGH SENSITIVITY - Normal    Troponin T, High Sensitivity <6        Emergency Department Course & Assessments:    Interventions:  Medications   0.9% sodium chloride BOLUS (1,000 mLs Intravenous $New Bag 5/24/23 1941)      Assessments:  1738 I obtained history and examined the patient as noted above.   I rechecked the patient and explained findings. I discussed plan for discharge home.    Independent Interpretation (X-rays, CTs, rhythm strip):  None    Consultations/Discussion of Management or Tests:  None    Social Determinants of Health affecting care:  None     Disposition:  The patient was discharged to home.     Impression & Plan    Medical Decision Making:  Lawanda Crawford is an 82-year-old woman who is afebrile and hemodynamically stable.  She is neurologically intact no focal deficits.  She is neurovascular intact in the lower extremities with no evidence of acute septic joint or acute ischemic limb.  She has no weakness.  She is good distal pulses and capillary refill.  Lab work-up as noted as above and is grossly unrevealing.  Nonspecific etiology of her generalized weakness, legs worse than upper extremities.  No indication for central head imaging such as MRI or CT scan of the head.  I discussed supportive care at home and strict return precautions were given.  She feels very comfortable at this time as her weakness is all gone she is up and ambulating without difficulty.  She will follow very close with her primary care physician and get plenty of rest and stay hydrated.  We discussed strict return precautions and her questions were answered.  She was in no distress at time of discharge.    Diagnosis:    ICD-10-CM    1. Generalized muscle weakness  M62.81            Scribe Disclosure:  I, León Vergara, am serving as a scribe at 5:36 PM on 5/24/2023 to document services personally performed by Ronna  Saw GUERRA MD based on my observations and the provider's statements to me.  5/24/2023   Saw Friend MD Salay, Nicholas J, MD  05/25/23 0138

## 2023-05-24 NOTE — ED NOTES
"Tele-PIT/Intake Evaluation      Video-Visit Details    Type of service:  Video Visit    Video Start Time (time video started): 4:31 PM  Video End Time (time video stopped): 4:35 PM   Originating Location (pt. Location):  [facility]  Distant Location (provider location):  [facility]  Mode of Communication:  Video Conference via [employer]  Patient verbally consented to [employer] televisit.    History:  Patient presents with weakness which she describes as a generalized malaise and fatigue, she denies any focal weakness isolated to one side of the body.  She denies any recent injuries denies any headache.  Denies any recent fevers.    Exam:  Cardiovascular: Appears well perfused  Lungs: No respiratory distress, speaking in full sentences  Patient Vitals for the past 24 hrs:   BP Temp Temp src Pulse Resp SpO2 Height Weight   05/24/23 1630 (!) 151/64 96.8  F (36  C) Temporal 89 18 96 % 1.549 m (5' 1\") 63.5 kg (140 lb)       Appropriate interventions for symptom management were initiated if applicable.  Appropriate diagnostic tests were initiated if indicated.    Important information for subsequent clinician:  Generalized malaise fatigue, laboratory work-up including urinalysis and COVID testing are ordered.  Hospitalist    I briefly evaluated the patient and developed an initial plan of care. I discussed this plan and explained that this brief interaction does not constitute a full evaluation. Patient/family understands that they should wait to be fully evaluated and discuss any test results with another clinician prior to leaving the hospital.     Trigger, Telly Hung MD  05/24/23 1636    "

## 2023-05-24 NOTE — ED TRIAGE NOTES
Pt reports sudden feeling of generalized weakness after walking her dog this morning     Triage Assessment     Row Name 05/24/23 1631       Triage Assessment (Adult)    Airway WDL WDL       Respiratory WDL    Respiratory WDL WDL       Skin Circulation/Temperature WDL    Skin Circulation/Temperature WDL WDL       Cardiac WDL    Cardiac WDL WDL       Peripheral/Neurovascular WDL    Peripheral Neurovascular WDL WDL       Cognitive/Neuro/Behavioral WDL    Cognitive/Neuro/Behavioral WDL WDL

## 2023-05-25 NOTE — ED NOTES
"Patient tolerated and completed the IV bolus.   Walked to the bathroom with steady gait.   Blood pressure 130/73, pulse 72, temperature 96.8  F (36  C), temperature source Temporal, resp. rate 16, height 1.549 m (5' 1\"), weight 63.5 kg (140 lb), SpO2 94 %.    Discharged to home.   All belongings sent with patient.   AVS and discharge instructions given and explained to patient, verbalized understanding.     "

## 2023-05-25 NOTE — DISCHARGE INSTRUCTIONS
Please get plenty of rest and drink plenty of fluids.  I recommend he follow very close with your primary care physician.  Your blood work and urine sample today are all normal.  Please return if anything worsens or changes or you have any other concerning symptoms.

## 2023-05-25 NOTE — ED NOTES
"Alert and oriented.   Breathing well on room air.   Denied pain.   Blood pressure 134/66, pulse 76, temperature 96.8  F (36  C), temperature source Temporal, resp. rate 19, height 1.549 m (5' 1\"), weight 63.5 kg (140 lb), SpO2 94 %.    Started on IV bolus.       "

## 2023-10-26 ENCOUNTER — LAB REQUISITION (OUTPATIENT)
Dept: LAB | Facility: CLINIC | Age: 83
End: 2023-10-26
Payer: MEDICARE

## 2023-10-26 DIAGNOSIS — Z11.1 ENCOUNTER FOR SCREENING FOR RESPIRATORY TUBERCULOSIS: ICD-10-CM

## 2023-10-27 PROCEDURE — 36415 COLL VENOUS BLD VENIPUNCTURE: CPT | Performed by: INTERNAL MEDICINE

## 2023-10-27 PROCEDURE — 86481 TB AG RESPONSE T-CELL SUSP: CPT | Performed by: INTERNAL MEDICINE

## 2023-10-29 ENCOUNTER — LAB REQUISITION (OUTPATIENT)
Dept: LAB | Facility: CLINIC | Age: 83
End: 2023-10-29
Payer: MEDICARE

## 2023-10-29 VITALS
TEMPERATURE: 96.8 F | HEART RATE: 83 BPM | RESPIRATION RATE: 16 BRPM | WEIGHT: 149.2 LBS | DIASTOLIC BLOOD PRESSURE: 60 MMHG | OXYGEN SATURATION: 95 % | SYSTOLIC BLOOD PRESSURE: 109 MMHG | BODY MASS INDEX: 28.19 KG/M2

## 2023-10-29 DIAGNOSIS — K92.2 GASTROINTESTINAL HEMORRHAGE, UNSPECIFIED: ICD-10-CM

## 2023-10-29 PROBLEM — F32.5 MAJOR DEPRESSIVE DISORDER WITH SINGLE EPISODE, IN REMISSION (H): Status: ACTIVE | Noted: 2019-11-12

## 2023-10-29 PROBLEM — M81.8 OTHER OSTEOPOROSIS WITHOUT CURRENT PATHOLOGICAL FRACTURE: Status: ACTIVE | Noted: 2019-11-12

## 2023-10-29 LAB
GAMMA INTERFERON BACKGROUND BLD IA-ACNC: 0 IU/ML
M TB IFN-G BLD-IMP: NEGATIVE
M TB IFN-G CD4+ BCKGRND COR BLD-ACNC: 10 IU/ML
MITOGEN IGNF BCKGRD COR BLD-ACNC: 0.01 IU/ML
MITOGEN IGNF BCKGRD COR BLD-ACNC: 0.03 IU/ML
QUANTIFERON MITOGEN: 10 IU/ML
QUANTIFERON NIL TUBE: 0 IU/ML
QUANTIFERON TB1 TUBE: 0.03 IU/ML
QUANTIFERON TB2 TUBE: 0.01

## 2023-10-29 RX ORDER — PANTOPRAZOLE SODIUM 40 MG/1
40 TABLET, DELAYED RELEASE ORAL DAILY
COMMUNITY
End: 2024-05-19

## 2023-10-30 ENCOUNTER — TRANSITIONAL CARE UNIT VISIT (OUTPATIENT)
Dept: GERIATRICS | Facility: CLINIC | Age: 83
End: 2023-10-30
Payer: MEDICARE

## 2023-10-30 ENCOUNTER — LAB REQUISITION (OUTPATIENT)
Dept: LAB | Facility: CLINIC | Age: 83
End: 2023-10-30
Payer: MEDICARE

## 2023-10-30 DIAGNOSIS — D64.9 ANEMIA, UNSPECIFIED TYPE: ICD-10-CM

## 2023-10-30 DIAGNOSIS — Z86.711 HISTORY OF PULMONARY EMBOLISM: ICD-10-CM

## 2023-10-30 DIAGNOSIS — K92.2 GASTROINTESTINAL HEMORRHAGE, UNSPECIFIED GASTROINTESTINAL HEMORRHAGE TYPE: Primary | ICD-10-CM

## 2023-10-30 DIAGNOSIS — F32.5 MAJOR DEPRESSIVE DISORDER WITH SINGLE EPISODE, IN REMISSION (H): ICD-10-CM

## 2023-10-30 DIAGNOSIS — I26.99 OTHER PULMONARY EMBOLISM WITHOUT ACUTE COR PULMONALE (H): ICD-10-CM

## 2023-10-30 PROCEDURE — 99310 SBSQ NF CARE HIGH MDM 45: CPT | Performed by: FAMILY MEDICINE

## 2023-10-30 NOTE — LETTER
10/30/2023        RE: Lawanda Crawford  [id number] [facility] [id number]        [facility] GERIATRIC SERVICES    Facility:   [facility] (U) [[id number]]   Code Status: FULL CODE      HPI:   Lawanda is a 82 year old female who was hospitalized October 19, 2023 secondary to hemoptysis and melena with concern for GI bleed.  He presented to the emergency department after episodes of hematemesis and melena.  She found blood both in her bed and on the bathroom floor and initially did not know what it was.  She did have associated dizziness and possible syncopal episode.  No history of GI bleed.  She currently is on warfarin secondary to a history of DVT.  She was given 2 units of fresh frozen plasma for a supratherapeutic INR 6.0.  For the anemia she was given 2 units of packed red blood cells.  Other issues addressed in the hospital was her history of diastolic heart murmur the TTE on October 26 did show possible mild aortic stenosis with mitral valve insufficiency.  She also does have a history of memory impairment, hyperlipidemia who is on simvastatin, hypertension who is on amlodipine 10 mg and a history of major depressive disorder who is on Lexapro 20 mg daily.  For the GI bleed she was placed on pantoprazole 40 mg twice daily.  She is now currently in the transitional care and to which we did talk about her goals and the goals of the transitional care unit.  She is now living in the [facility] and a 1 level [facility].  Her systolic blood pressures ranging 109-139 she has been afebrile and also on room air with a weight of 149 pounds.  She is supposed to get her INR hemoglobin checked today but somehow missed and we will continue with 5 mg of warfarin.  She feels her appetite to be pretty good has not noticed any blood.  Her last hemoglobin on October 26 was 8.1.  She denies any discomfort.  She is overall feeling pretty good and is excited to do therapy process.    Past Medical  History:  No past medical history on file.     Recurrent PE, DVT, diastolic heart murmur, upper GI bleed, hyperlipidemia, hypertension, MDD, osteoporosis  Surgical History:  No past surgical history on file.  Melanoma excision, bilateral cataract extraction with intraocular lens insertion  Family History:   No family history on file.    Social History:    Social History     Socioeconomic History     Marital status: Single        REVIEW OF SYSTEM:  She currently denies any symptoms of fever cough or cold sore throat postnasal drip allergies shortness of breath dyspnea wheezing chest pain dizziness or vertigo nausea vomiting diarrhea dysuria frequency urgency or pain.    PHYSICAL EXAM:   Pleasant female in no acute distress.  Head is normocephalic.  Conjunctiva is pink and sclera is clear.  Neck is supple without adenopathy.  Lung sounds are clear throughout.  Cardiovascular S1-S2 regular rate and rhythm and no lower extremity edema.  Gastrointestinal soft nontender and nondistended.  Positive bowel sounds.  Musculoskeletal denies pain to her major joints.  Psychiatric: Pleasant affect    Vitals:    10/29/23 1722   BP: 109/60   Pulse: 83   Resp: 16   Temp: 96.8  F (36  C)   SpO2: 95%   Weight: 67.7 kg (149 lb 3.2 oz)         Medication List:  Current Outpatient Medications   Medication Sig     amLODIPine (NORVASC) 10 MG tablet Take 10 mg by mouth daily     escitalopram (LEXAPRO) 20 MG tablet Take 20 mg by mouth daily     pantoprazole (PROTONIX) 40 MG EC tablet Take 40 mg by mouth daily     simvastatin (ZOCOR) 20 MG tablet Take 20 mg by mouth At Bedtime     warfarin ANTICOAGULANT (COUMADIN) 5 MG tablet Take 5 mg (5 mg x 1 TABLET) BY MOUTH every Sun, Tue, Thu; AND TAKE 7.5 mg (5 mg x 1.5 TABLETS) all other days OR as directed by INR clinic.     No current facility-administered medications for this visit.        MED REC REQUIRED  Post Medication Reconciliation Status: discharge medications reconciled and changed, per  note/orders       Labs:  Hemoglobin   Date Value Ref Range Status   05/24/2023 12.4 11.7 - 15.7 g/dL Final   ]  CBC RESULTS:   Recent Labs   Lab Test 05/24/23  1737   WBC 8.8   RBC 4.60   HGB 12.4   HCT 38.7   MCV 84   MCH 27.0   MCHC 32.0   RDW 15.3*        Last Comprehensive Metabolic Panel:  Lab Results   Component Value Date     (L) 05/24/2023    POTASSIUM 4.0 05/24/2023    CHLORIDE 100 05/24/2023    CO2 22 05/24/2023    ANIONGAP 12 05/24/2023     (H) 05/24/2023    BUN 12.4 05/24/2023    CR 0.73 05/24/2023    GFRESTIMATED 82 05/24/2023    VINCE 9.3 05/24/2023 October 26 hemoglobin 8.1, sodium 139, potassium 4.1, BUN 9, creatinine 0.73      Assessment:   (K92.2) Gastrointestinal hemorrhage, unspecified gastrointestinal hemorrhage type  (primary encounter diagnosis)  Comment: Doing well  Plan: Continue with Protonix 40 mg twice daily    (D64.9) Anemia, unspecified type  Comment: Stable.  8.1  Plan: Was supposed have a hemoglobin today but somehow got missed    (Z86.711) History of pulmonary embolism  Comment: Currently on warfarin  Plan: Supposed have an INR today but somehow It got missed    (F32.5) Major depressive disorder with single episode, in remission (H24)  Comment: Continue with escitalopram 20 mg  Plan: Doing well      PLAN:    We discussed her hemoglobin warfarin.  She seems to be pretty good spirits overall.  Appetite is good.  Hopefully by tomorrow we can recheck her hemoglobin and pro time.  Have her give her 5 mg of warfarin today.  Otherwise she is asymptomatic.  She is in good spirits overall.  Her ultimate goal is to go back home.  Home.  She did not have any other questions.    For documentation purposes total visit 45 minutes to which over 50% was spent with the patient going over her hospitalization, laboratory studies, reassurance, future hemoglobin, future INR, nutrition and the rehabilitation component      Electronically signed by: Luis Gutierrez  NP      Sincerely,        Luis Gutierrez NP

## 2023-10-30 NOTE — PROGRESS NOTES
[facility] GERIATRIC SERVICES    Facility:   [facility] ([facility]) [[id number]]   Code Status: FULL CODE      HPI:   Lawanda is a 82 year old female who was hospitalized October 19, 2023 secondary to hemoptysis and melena with concern for GI bleed.  He presented to the emergency department after episodes of hematemesis and melena.  She found blood both in her bed and on the bathroom floor and initially did not know what it was.  She did have associated dizziness and possible syncopal episode.  No history of GI bleed.  She currently is on warfarin secondary to a history of DVT.  She was given 2 units of fresh frozen plasma for a supratherapeutic INR 6.0.  For the anemia she was given 2 units of packed red blood cells.  Other issues addressed in the hospital was her history of diastolic heart murmur the TTE on October 26 did show possible mild aortic stenosis with mitral valve insufficiency.  She also does have a history of memory impairment, hyperlipidemia who is on simvastatin, hypertension who is on amlodipine 10 mg and a history of major depressive disorder who is on Lexapro 20 mg daily.  For the GI bleed she was placed on pantoprazole 40 mg twice daily.  She is now currently in the transitional care and to which we did talk about her goals and the goals of the transitional care unit.  She is now living in the [facility] and a [address] [place] [address].  Her systolic blood pressures ranging 109-139 she has been afebrile and also on room air with a weight of 149 pounds.  She is supposed to get her INR hemoglobin checked today but somehow missed and we will continue with 5 mg of warfarin.  She feels her appetite to be pretty good has not noticed any blood.  Her last hemoglobin on October 26 was 8.1.  She denies any discomfort.  She is overall feeling pretty good and is excited to do therapy process.    Past Medical History:  No past medical history on file.     Recurrent PE, DVT, diastolic heart murmur, upper GI  bleed, hyperlipidemia, hypertension, MDD, osteoporosis  Surgical History:  No past surgical history on file.  Melanoma excision, bilateral cataract extraction with intraocular lens insertion  Family History:   No family history on file.    Social History:    Social History     Socioeconomic History    Marital status: Single        REVIEW OF SYSTEM:  She currently denies any symptoms of fever cough or cold sore throat postnasal drip allergies shortness of breath dyspnea wheezing chest pain dizziness or vertigo nausea vomiting diarrhea dysuria frequency urgency or pain.    PHYSICAL EXAM:   Pleasant female in no acute distress.  Head is normocephalic.  Conjunctiva is pink and sclera is clear.  Neck is supple without adenopathy.  Lung sounds are clear throughout.  Cardiovascular S1-S2 regular rate and rhythm and no lower extremity edema.  Gastrointestinal soft nontender and nondistended.  Positive bowel sounds.  Musculoskeletal denies pain to her major joints.  Psychiatric: Pleasant affect    Vitals:    10/29/23 1722   BP: 109/60   Pulse: 83   Resp: 16   Temp: 96.8  F (36  C)   SpO2: 95%   Weight: 67.7 kg (149 lb 3.2 oz)         Medication List:  Current Outpatient Medications   Medication Sig    amLODIPine (NORVASC) 10 MG tablet Take 10 mg by mouth daily    escitalopram (LEXAPRO) 20 MG tablet Take 20 mg by mouth daily    pantoprazole (PROTONIX) 40 MG EC tablet Take 40 mg by mouth daily    simvastatin (ZOCOR) 20 MG tablet Take 20 mg by mouth At Bedtime    warfarin ANTICOAGULANT (COUMADIN) 5 MG tablet Take 5 mg (5 mg x 1 TABLET) BY MOUTH every Sun, Tue, Thu; AND TAKE 7.5 mg (5 mg x 1.5 TABLETS) all other days OR as directed by INR clinic.     No current facility-administered medications for this visit.        MED REC REQUIRED  Post Medication Reconciliation Status: discharge medications reconciled and changed, per note/orders       Labs:  Hemoglobin   Date Value Ref Range Status   05/24/2023 12.4 11.7 - 15.7 g/dL Final    ]  CBC RESULTS:   Recent Labs   Lab Test 05/24/23  1737   WBC 8.8   RBC 4.60   HGB 12.4   HCT 38.7   MCV 84   MCH 27.0   MCHC 32.0   RDW 15.3*        Last Comprehensive Metabolic Panel:  Lab Results   Component Value Date     (L) 05/24/2023    POTASSIUM 4.0 05/24/2023    CHLORIDE 100 05/24/2023    CO2 22 05/24/2023    ANIONGAP 12 05/24/2023     (H) 05/24/2023    BUN 12.4 05/24/2023    CR 0.73 05/24/2023    GFRESTIMATED 82 05/24/2023    VINCE 9.3 05/24/2023 October 26 hemoglobin 8.1, sodium 139, potassium 4.1, BUN 9, creatinine 0.73      Assessment:   (K92.2) Gastrointestinal hemorrhage, unspecified gastrointestinal hemorrhage type  (primary encounter diagnosis)  Comment: Doing well  Plan: Continue with Protonix 40 mg twice daily    (D64.9) Anemia, unspecified type  Comment: Stable.  8.1  Plan: Was supposed have a hemoglobin today but somehow got missed    (Z86.711) History of pulmonary embolism  Comment: Currently on warfarin  Plan: Supposed have an INR today but somehow It got missed    (F32.5) Major depressive disorder with single episode, in remission (H24)  Comment: Continue with escitalopram 20 mg  Plan: Doing well      PLAN:    We discussed her hemoglobin warfarin.  She seems to be pretty good spirits overall.  Appetite is good.  Hopefully by tomorrow we can recheck her hemoglobin and pro time.  Have her give her 5 mg of warfarin today.  Otherwise she is asymptomatic.  She is in good spirits overall.  Her ultimate goal is to go back home.  Home.  She did not have any other questions.    For documentation purposes total visit 45 minutes to which over 50% was spent with the patient going over her hospitalization, laboratory studies, reassurance, future hemoglobin, future INR, nutrition and the rehabilitation component      Electronically signed by: Luis Gutierrez NP

## 2023-10-31 ENCOUNTER — TELEPHONE (OUTPATIENT)
Dept: GERIATRICS | Facility: CLINIC | Age: 83
End: 2023-10-31

## 2023-10-31 LAB
HGB BLD-MCNC: 8.7 G/DL (ref 11.7–15.7)
INR PPP: 1.21 (ref 0.85–1.15)

## 2023-10-31 PROCEDURE — 36415 COLL VENOUS BLD VENIPUNCTURE: CPT | Mod: ORL | Performed by: INTERNAL MEDICINE

## 2023-10-31 PROCEDURE — 85610 PROTHROMBIN TIME: CPT | Performed by: INTERNAL MEDICINE

## 2023-10-31 PROCEDURE — P9604 ONE-WAY ALLOW PRORATED TRIP: HCPCS | Mod: ORL | Performed by: INTERNAL MEDICINE

## 2023-10-31 PROCEDURE — 85018 HEMOGLOBIN: CPT | Performed by: INTERNAL MEDICINE

## 2023-10-31 NOTE — TELEPHONE ENCOUNTER
[facility] Geriatrics Triage Nurse INR     Provider: MILVIA Finley  Facility: [facility]   Facility Type:  TCU    Caller: Pavel   Call Back Number: [phone]  Reason for call: INR  Diagnosis/Goal: PE    Todays INR: 1.21   Last INR Since 10/25 was getting 5mg every day     Heparin/Lovenox:  No  Currently on ABX?: No  Other interacting medication:  None  Missed or refused doses: No    Verbal Order/Direction given by Provider: 7mg every day Next INR 11/3    Provider Giving Order:  MILVIA Finley    Verbal Order given to: Pavel Rodriguez RN

## 2023-11-02 ENCOUNTER — LAB REQUISITION (OUTPATIENT)
Dept: LAB | Facility: CLINIC | Age: 83
End: 2023-11-02
Payer: MEDICARE

## 2023-11-02 VITALS
DIASTOLIC BLOOD PRESSURE: 68 MMHG | SYSTOLIC BLOOD PRESSURE: 108 MMHG | RESPIRATION RATE: 18 BRPM | BODY MASS INDEX: 28.19 KG/M2 | TEMPERATURE: 97.8 F | OXYGEN SATURATION: 94 % | HEART RATE: 82 BPM | WEIGHT: 149.2 LBS

## 2023-11-02 DIAGNOSIS — I26.99 OTHER PULMONARY EMBOLISM WITHOUT ACUTE COR PULMONALE (H): ICD-10-CM

## 2023-11-03 ENCOUNTER — TRANSITIONAL CARE UNIT VISIT (OUTPATIENT)
Dept: GERIATRICS | Facility: CLINIC | Age: 83
End: 2023-11-03
Payer: MEDICARE

## 2023-11-03 ENCOUNTER — TELEPHONE (OUTPATIENT)
Dept: GERIATRICS | Facility: CLINIC | Age: 83
End: 2023-11-03

## 2023-11-03 DIAGNOSIS — K92.2 UGIB (UPPER GASTROINTESTINAL BLEED): ICD-10-CM

## 2023-11-03 DIAGNOSIS — I10 ESSENTIAL HYPERTENSION: ICD-10-CM

## 2023-11-03 DIAGNOSIS — R53.81 PHYSICAL DEBILITY: ICD-10-CM

## 2023-11-03 DIAGNOSIS — D64.9 ANEMIA, UNSPECIFIED TYPE: Primary | ICD-10-CM

## 2023-11-03 LAB — INR PPP: 1.81 (ref 0.85–1.15)

## 2023-11-03 PROCEDURE — 36415 COLL VENOUS BLD VENIPUNCTURE: CPT | Mod: ORL | Performed by: INTERNAL MEDICINE

## 2023-11-03 PROCEDURE — 99316 NF DSCHRG MGMT 30 MIN+: CPT | Performed by: FAMILY MEDICINE

## 2023-11-03 PROCEDURE — 85610 PROTHROMBIN TIME: CPT | Performed by: INTERNAL MEDICINE

## 2023-11-03 PROCEDURE — P9603 ONE-WAY ALLOW PRORATED MILES: HCPCS | Performed by: INTERNAL MEDICINE

## 2023-11-03 NOTE — CONFIDENTIAL NOTE
Patient's INR today 1.87  Last INR 1.21    Currently taking 7 mg/day  Goal INR 2-3    Plan:  Continue warfarin 7 mg/day   Redraw INR Monday 11/6/23    Chelsea Robin NP

## 2023-11-03 NOTE — PROGRESS NOTES
[facility] GERIATRIC SERVICES    Facility:   [facility] ([facility]) [[id number]]   Code Status: FULL CODE      CHIEF COMPLAINT/REASON FOR VISIT:  Chief Complaint   Patient presents with    Discharge Summary Nursing Home       HISTORY:      HPI: Lawanda is a 82 year old female who was hospitalized October 19, 2023 secondary to hemoptysis and melena with concern for GI bleed.  He presented to the emergency department after episodes of hematemesis and melena.  She found blood both in her bed and on the bathroom floor and initially did not know what it was.  She did have associated dizziness and possible syncopal episode.  No history of GI bleed.  She currently is on warfarin secondary to a history of DVT.  She was given 2 units of fresh frozen plasma for a supratherapeutic INR 6.0.  For the anemia she was given 2 units of packed red blood cells.  Other issues addressed in the hospital was her history of diastolic heart murmur the TTE on October 26 did show possible mild aortic stenosis with mitral valve insufficiency.  She also does have a history of memory impairment, hyperlipidemia who is on simvastatin, hypertension who is on amlodipine 10 mg and a history of major depressive disorder who is on Lexapro 20 mg daily.  For the GI bleed she was placed on pantoprazole 40 mg twice daily.  She has been in the transitional care unit and at this point has been able to successfully participate with the rehabilitation services and now will be discharging to home with services.  She has been normotensive and afebrile and also on room air.  Does have a pro time scheduled for today its not yet back by this dictation.  Currently on 5 mg of warfarin.  She otherwise has been asymptomatic.  Her appetite is good.  At this point does not have any reservations about discharging to home.    No past medical history on file.         No family history on file.   Social History     Socioeconomic History    Marital status: Single         REVIEW OF SYSTEM:  She currently denies any symptoms of fever cough or cold sore throat postnasal drip allergies shortness of breath dyspnea wheezing chest pain dizziness or vertigo nausea vomiting diarrhea dysuria frequency urgency or pain.     PHYSICAL EXAM:   Pleasant female in no acute distress.  Head is normocephalic.  Neck is supple without adenopathy.  Lung sounds are clear throughout.  Cardiovascular S1-S2 regular rate and rhythm and no lower extremity edema.  Gastrointestinal soft nontender and nondistended.  Positive bowel sounds.  Musculoskeletal denies pain to her major joints.  Psychiatric: Pleasant affect     Current Outpatient Medications:     amLODIPine (NORVASC) 10 MG tablet, Take 10 mg by mouth daily, Disp: , Rfl:     escitalopram (LEXAPRO) 20 MG tablet, Take 20 mg by mouth daily, Disp: , Rfl:     pantoprazole (PROTONIX) 40 MG EC tablet, Take 40 mg by mouth daily, Disp: , Rfl:     simvastatin (ZOCOR) 20 MG tablet, Take 20 mg by mouth At Bedtime, Disp: , Rfl:     warfarin ANTICOAGULANT (COUMADIN) 5 MG tablet, Take 5 mg (5 mg x 1 TABLET) BY MOUTH every Sun, Tue, Thu; AND TAKE 7.5 mg (5 mg x 1.5 TABLETS) all other days OR as directed by INR clinic., Disp: , Rfl:     /68   Pulse 82   Temp 97.8  F (36.6  C)   Resp 18   Wt 67.7 kg (149 lb 3.2 oz)   SpO2 94%   BMI 28.19 kg/m        LABS:   CBC RESULTS:   Recent Labs   Lab Test 10/31/23  0749 05/24/23  1737   WBC  --  8.8   RBC  --  4.60   HGB 8.7* 12.4   HCT  --  38.7   MCV  --  84   MCH  --  27.0   MCHC  --  32.0   RDW  --  15.3*   PLT  --  415     Last Comprehensive Metabolic Panel:  Lab Results   Component Value Date     (L) 05/24/2023    POTASSIUM 4.0 05/24/2023    CHLORIDE 100 05/24/2023    CO2 22 05/24/2023    ANIONGAP 12 05/24/2023     (H) 05/24/2023    BUN 12.4 05/24/2023    CR 0.73 05/24/2023    GFRESTIMATED 82 05/24/2023    VINCE 9.3 05/24/2023           ASSESSMENT:    Encounter Diagnoses   Name Primary?    Anemia,  unspecified type Yes    Essential hypertension     UGIB (upper gastrointestinal bleed)     Physical debility        MEDICAL EQUIPMENT NEEDS:  None    DISCHARGE PLAN/FACE TO FACE:  I certify that services are/were furnished while this patient was under the care of a physician and that a physician or an allowed non-physician practitioner (NPP), had a face-to-face encounter that meets the physician face-to-face encounter requirements. The encounter was in whole, or in part, related to the primary reason for home health. The patient is confined to his/her home and needs intermittent skilled nursing, physical therapy, speech-language pathology, or the continued need for occupational therapy. A plan of care has been established by a physician and is periodically reviewed by a physician.  Date of Face-to-Face Encounter: November 3, 2023    I certify that, based on my findings, the following services are medically necessary home health services: When I will be discharging to home with current medications as well as physical and occupational therapy home health aide and nursing with an approximated discharge date of November 4    My clinical findings support the need for the above skilled services because: (Please write a brief narrative summary that describes what the RN, PT, SLP, or other services will be doing in the home. A list of diagnoses in this section does not meet the CMS requirements.)  She will require skilled services secondary to her chronic medical conditions as well as her recent hospitalization but also continuation of the rehabilitation process and home safety along with nursing for medication management    This patient is homebound because: (Please write a brief narrative summary describing the functional limitations as to why this patient is homebound and specifically what makes this patient homebound.)  Secondary to chronic medical conditions including her recent hospitalization but also continuation of  the rehabilitation process and home safety along with nursing for medication management    The patient is, or has been, under my care and I have initiated the establishment of the plan of care. This patient will be followed by a physician who will periodically review the plan of care.    Schedule follow up visit with primary care provider within 7 days to reestablish care.  She will follow-up with her primary care doctor regarding medication management and any future laboratory studies certainly again looking at the hemoglobin.  I believe she does have a follow-up visit with gastroenterology in the near future as well.  Otherwise she states she is doing well does not have any concerns or questions about her discharge to home.    Discharge coordination care greater than 30 minutes    Electronically signed by: Luis Gutierrez NP

## 2023-11-03 NOTE — LETTER
11/3/2023        RE: Lawanda Crawford  [address] [facility]  [place] [address]        [facility] GERIATRIC SERVICES    Facility:   [facility] ([facility]) [[id number]]   Code Status: FULL CODE      CHIEF COMPLAINT/REASON FOR VISIT:  Chief Complaint   Patient presents with     Discharge Summary Nursing Home       HISTORY:      HPI: Lawanda is a 82 year old female who was hospitalized October 19, 2023 secondary to hemoptysis and melena with concern for GI bleed.  He presented to the emergency department after episodes of hematemesis and melena.  She found blood both in her bed and on the bathroom floor and initially did not know what it was.  She did have associated dizziness and possible syncopal episode.  No history of GI bleed.  She currently is on warfarin secondary to a history of DVT.  She was given 2 units of fresh frozen plasma for a supratherapeutic INR 6.0.  For the anemia she was given 2 units of packed red blood cells.  Other issues addressed in the hospital was her history of diastolic heart murmur the TTE on October 26 did show possible mild aortic stenosis with mitral valve insufficiency.  She also does have a history of memory impairment, hyperlipidemia who is on simvastatin, hypertension who is on amlodipine 10 mg and a history of major depressive disorder who is on Lexapro 20 mg daily.  For the GI bleed she was placed on pantoprazole 40 mg twice daily.  She has been in the transitional care unit and at this point has been able to successfully participate with the rehabilitation services and now will be discharging to home with services.  She has been normotensive and afebrile and also on room air.  Does have a pro time scheduled for today its not yet back by this dictation.  Currently on 5 mg of warfarin.  She otherwise has been asymptomatic.  Her appetite is good.  At this point does not have any reservations about discharging to home.    No past medical history on file.         No family  history on file.   Social History     Socioeconomic History     Marital status: Single        REVIEW OF SYSTEM:  She currently denies any symptoms of fever cough or cold sore throat postnasal drip allergies shortness of breath dyspnea wheezing chest pain dizziness or vertigo nausea vomiting diarrhea dysuria frequency urgency or pain.     PHYSICAL EXAM:   Pleasant female in no acute distress.  Head is normocephalic.  Neck is supple without adenopathy.  Lung sounds are clear throughout.  Cardiovascular S1-S2 regular rate and rhythm and no lower extremity edema.  Gastrointestinal soft nontender and nondistended.  Positive bowel sounds.  Musculoskeletal denies pain to her major joints.  Psychiatric: Pleasant affect     Current Outpatient Medications:      amLODIPine (NORVASC) 10 MG tablet, Take 10 mg by mouth daily, Disp: , Rfl:      escitalopram (LEXAPRO) 20 MG tablet, Take 20 mg by mouth daily, Disp: , Rfl:      pantoprazole (PROTONIX) 40 MG EC tablet, Take 40 mg by mouth daily, Disp: , Rfl:      simvastatin (ZOCOR) 20 MG tablet, Take 20 mg by mouth At Bedtime, Disp: , Rfl:      warfarin ANTICOAGULANT (COUMADIN) 5 MG tablet, Take 5 mg (5 mg x 1 TABLET) BY MOUTH every Sun, Tue, Thu; AND TAKE 7.5 mg (5 mg x 1.5 TABLETS) all other days OR as directed by INR clinic., Disp: , Rfl:     /68   Pulse 82   Temp 97.8  F (36.6  C)   Resp 18   Wt 67.7 kg (149 lb 3.2 oz)   SpO2 94%   BMI 28.19 kg/m        LABS:   CBC RESULTS:   Recent Labs   Lab Test 10/31/23  0749 05/24/23  1737   WBC  --  8.8   RBC  --  4.60   HGB 8.7* 12.4   HCT  --  38.7   MCV  --  84   MCH  --  27.0   MCHC  --  32.0   RDW  --  15.3*   PLT  --  415     Last Comprehensive Metabolic Panel:  Lab Results   Component Value Date     (L) 05/24/2023    POTASSIUM 4.0 05/24/2023    CHLORIDE 100 05/24/2023    CO2 22 05/24/2023    ANIONGAP 12 05/24/2023     (H) 05/24/2023    BUN 12.4 05/24/2023    CR 0.73 05/24/2023    GFRESTIMATED 82 05/24/2023     VINCE 9.3 05/24/2023           ASSESSMENT:    Encounter Diagnoses   Name Primary?     Anemia, unspecified type Yes     Essential hypertension      UGIB (upper gastrointestinal bleed)      Physical debility        MEDICAL EQUIPMENT NEEDS:  None    DISCHARGE PLAN/FACE TO FACE:  I certify that services are/were furnished while this patient was under the care of a physician and that a physician or an allowed non-physician practitioner (NPP), had a face-to-face encounter that meets the physician face-to-face encounter requirements. The encounter was in whole, or in part, related to the primary reason for home health. The patient is confined to his/her home and needs intermittent skilled nursing, physical therapy, speech-language pathology, or the continued need for occupational therapy. A plan of care has been established by a physician and is periodically reviewed by a physician.  Date of Face-to-Face Encounter: November 3, 2023    I certify that, based on my findings, the following services are medically necessary home health services: When I will be discharging to home with current medications as well as physical and occupational therapy home health aide and nursing with an approximated discharge date of November 4    My clinical findings support the need for the above skilled services because: (Please write a brief narrative summary that describes what the RN, PT, SLP, or other services will be doing in the home. A list of diagnoses in this section does not meet the CMS requirements.)  She will require skilled services secondary to her chronic medical conditions as well as her recent hospitalization but also continuation of the rehabilitation process and home safety along with nursing for medication management    This patient is homebound because: (Please write a brief narrative summary describing the functional limitations as to why this patient is homebound and specifically what makes this patient homebound.)   Secondary to chronic medical conditions including her recent hospitalization but also continuation of the rehabilitation process and home safety along with nursing for medication management    The patient is, or has been, under my care and I have initiated the establishment of the plan of care. This patient will be followed by a physician who will periodically review the plan of care.    Schedule follow up visit with primary care provider within 7 days to reestablish care.  She will follow-up with her primary care doctor regarding medication management and any future laboratory studies certainly again looking at the hemoglobin.  I believe she does have a follow-up visit with gastroenterology in the near future as well.  Otherwise she states she is doing well does not have any concerns or questions about her discharge to home.    Discharge coordination care greater than 30 minutes    Electronically signed by: Luis Gutierrez NP      Sincerely,        Luis Gutierrez NP

## 2024-05-14 ENCOUNTER — LAB REQUISITION (OUTPATIENT)
Dept: LAB | Facility: CLINIC | Age: 84
End: 2024-05-14
Payer: MEDICARE

## 2024-05-14 DIAGNOSIS — Z11.1 ENCOUNTER FOR SCREENING FOR RESPIRATORY TUBERCULOSIS: ICD-10-CM

## 2024-05-15 ENCOUNTER — DOCUMENTATION ONLY (OUTPATIENT)
Dept: GERIATRICS | Facility: CLINIC | Age: 84
End: 2024-05-15
Payer: MEDICARE

## 2024-05-15 ENCOUNTER — LAB REQUISITION (OUTPATIENT)
Dept: LAB | Facility: CLINIC | Age: 84
End: 2024-05-15
Payer: MEDICARE

## 2024-05-15 DIAGNOSIS — D64.9 ANEMIA, UNSPECIFIED: ICD-10-CM

## 2024-05-15 PROCEDURE — P9604 ONE-WAY ALLOW PRORATED TRIP: HCPCS | Mod: ORL | Performed by: NURSE PRACTITIONER

## 2024-05-15 PROCEDURE — 86481 TB AG RESPONSE T-CELL SUSP: CPT | Performed by: NURSE PRACTITIONER

## 2024-05-15 PROCEDURE — 36415 COLL VENOUS BLD VENIPUNCTURE: CPT | Performed by: NURSE PRACTITIONER

## 2024-05-16 ENCOUNTER — TRANSITIONAL CARE UNIT VISIT (OUTPATIENT)
Dept: GERIATRICS | Facility: CLINIC | Age: 84
End: 2024-05-16
Payer: MEDICARE

## 2024-05-16 VITALS
OXYGEN SATURATION: 96 % | SYSTOLIC BLOOD PRESSURE: 134 MMHG | BODY MASS INDEX: 26.39 KG/M2 | WEIGHT: 134.4 LBS | RESPIRATION RATE: 16 BRPM | TEMPERATURE: 97.5 F | HEIGHT: 60 IN | DIASTOLIC BLOOD PRESSURE: 67 MMHG | HEART RATE: 90 BPM

## 2024-05-16 DIAGNOSIS — D64.9 ANEMIA, UNSPECIFIED TYPE: ICD-10-CM

## 2024-05-16 DIAGNOSIS — S82.142D CLOSED FRACTURE OF LEFT TIBIAL PLATEAU WITH ROUTINE HEALING, SUBSEQUENT ENCOUNTER: ICD-10-CM

## 2024-05-16 DIAGNOSIS — R41.9 NEUROCOGNITIVE DISORDER: ICD-10-CM

## 2024-05-16 DIAGNOSIS — D32.9 MENINGIOMA (H): ICD-10-CM

## 2024-05-16 DIAGNOSIS — Z47.89 AFTERCARE FOLLOWING SURGERY OF THE MUSCULOSKELETAL SYSTEM: Primary | ICD-10-CM

## 2024-05-16 DIAGNOSIS — I10 BENIGN ESSENTIAL HYPERTENSION: ICD-10-CM

## 2024-05-16 DIAGNOSIS — R53.81 PHYSICAL DECONDITIONING: ICD-10-CM

## 2024-05-16 PROBLEM — I50.32 CHRONIC DIASTOLIC (CONGESTIVE) HEART FAILURE (H): Status: ACTIVE | Noted: 2023-12-22

## 2024-05-16 PROBLEM — S82.143A: Status: ACTIVE | Noted: 2024-05-10

## 2024-05-16 LAB
ERYTHROCYTE [DISTWIDTH] IN BLOOD BY AUTOMATED COUNT: 22.5 % (ref 10–15)
GAMMA INTERFERON BACKGROUND BLD IA-ACNC: 0.01 IU/ML
HCT VFR BLD AUTO: 29.6 % (ref 35–47)
HGB BLD-MCNC: 8.9 G/DL (ref 11.7–15.7)
M TB IFN-G BLD-IMP: NEGATIVE
M TB IFN-G CD4+ BCKGRND COR BLD-ACNC: 2.93 IU/ML
MCH RBC QN AUTO: 23.7 PG (ref 26.5–33)
MCHC RBC AUTO-ENTMCNC: 30.1 G/DL (ref 31.5–36.5)
MCV RBC AUTO: 79 FL (ref 78–100)
MITOGEN IGNF BCKGRD COR BLD-ACNC: 0 IU/ML
MITOGEN IGNF BCKGRD COR BLD-ACNC: 0.01 IU/ML
PLATELET # BLD AUTO: 592 10E3/UL (ref 150–450)
QUANTIFERON MITOGEN: 2.94 IU/ML
QUANTIFERON NIL TUBE: 0.01 IU/ML
QUANTIFERON TB1 TUBE: 0.02 IU/ML
QUANTIFERON TB2 TUBE: 0.01
RBC # BLD AUTO: 3.76 10E6/UL (ref 3.8–5.2)
WBC # BLD AUTO: 7.4 10E3/UL (ref 4–11)

## 2024-05-16 PROCEDURE — 36415 COLL VENOUS BLD VENIPUNCTURE: CPT | Mod: ORL | Performed by: NURSE PRACTITIONER

## 2024-05-16 PROCEDURE — P9604 ONE-WAY ALLOW PRORATED TRIP: HCPCS | Mod: ORL | Performed by: NURSE PRACTITIONER

## 2024-05-16 PROCEDURE — 85027 COMPLETE CBC AUTOMATED: CPT | Mod: ORL | Performed by: NURSE PRACTITIONER

## 2024-05-16 PROCEDURE — 99309 SBSQ NF CARE MODERATE MDM 30: CPT | Performed by: NURSE PRACTITIONER

## 2024-05-16 RX ORDER — LANOLIN ALCOHOL/MO/W.PET/CERES
3 CREAM (GRAM) TOPICAL
COMMUNITY

## 2024-05-16 RX ORDER — VITAMIN B COMPLEX
1 TABLET ORAL DAILY
COMMUNITY

## 2024-05-16 RX ORDER — CALCIUM CARBONATE 500(1250)
1 TABLET ORAL DAILY
COMMUNITY

## 2024-05-16 RX ORDER — MULTIPLE VITAMINS W/ MINERALS TAB 9MG-400MCG
1 TAB ORAL DAILY
COMMUNITY

## 2024-05-16 RX ORDER — SENNOSIDES A AND B 8.6 MG/1
1 TABLET, FILM COATED ORAL 2 TIMES DAILY PRN
COMMUNITY

## 2024-05-16 RX ORDER — ACETAMINOPHEN 325 MG/1
325-650 TABLET ORAL EVERY 6 HOURS PRN
COMMUNITY

## 2024-05-16 RX ORDER — ENOXAPARIN SODIUM 100 MG/ML
0.7 INJECTION SUBCUTANEOUS DAILY
COMMUNITY
End: 2024-05-20

## 2024-05-16 RX ORDER — ONDANSETRON 4 MG/1
4 TABLET, FILM COATED ORAL EVERY 6 HOURS PRN
COMMUNITY

## 2024-05-16 RX ORDER — NYSTATIN 100000 [USP'U]/G
POWDER TOPICAL DAILY
COMMUNITY

## 2024-05-16 NOTE — LETTER
5/16/2024        RE: Lawanda Crawford  [address] [address] [address]        [facility] GERIATRICS    PRIMARY CARE PROVIDER AND CLINIC:  Physician No Ref-Primary, No address on file  Chief Complaint   Patient presents with     Hospital F/U      [place] Medical Record Number:  [id number]  Place of Service where encounter took place:  [facility] ([facility]) [[id number]]    Lawanda Crawford  is a 83 year old  (1940) female with a medical history of PE, hypertension and GIB. She was driving to [place] with her daughter where she had a fall and sustained a Left tibial plateau fracture. They drove back to [place] where she went to [facility] and is now s/p closed reduction. Had acute kidney injury that resolved. Chest pain with unremarkable work up. She was admitted to the above facility from  [facility]. Hospital stay 5/9/2024 through 5/14/2024..     Lawanda was visited today while in her room resting in bed.  She shares that she currently lives in a [facility] that is 1 level living, daughter lives several blocks away.  She has a dog whom she misses very much.  She has another daughter who lives in [place] which is where she was on her way to see when she had a fall.  She denies pain to the leg and reports that the cast that she has on now is far better than the one that she did previously.  She does not have chest pain or shortness of breath.  She is having regular bowel movements and no complaints of pain with urination.      CODE STATUS/ADVANCE DIRECTIVES DISCUSSION:  CPR/Full code   ALLERGIES:   Allergies   Allergen Reactions     Ofloxacin      light headedness      PAST MEDICAL HISTORY: No past medical history on file.   PAST SURGICAL HISTORY:   has no past surgical history on file.  FAMILY HISTORY: family history is not on file.  SOCIAL HISTORY:     Patient's living condition: lives alone    Post Discharge Medication Reconciliation Status:   MED REC REQUIRED  Post Medication Reconciliation  Status: discharge medications reconciled and changed, per note/orders       Current Outpatient Medications   Medication Sig Dispense Refill     acetaminophen (TYLENOL) 325 MG tablet Take 325-650 mg by mouth every 6 hours as needed for mild pain       amLODIPine (NORVASC) 10 MG tablet Take 10 mg by mouth daily       calcium carbonate (OS-VINCE) 500 MG TABS Take 1 tablet by mouth daily       enoxaparin ANTICOAGULANT (LOVENOX) 80 MG/0.8ML syringe Inject 0.7 mLs Subcutaneous daily       escitalopram (LEXAPRO) 20 MG tablet Take 20 mg by mouth daily       melatonin 3 MG tablet Take 3 mg by mouth nightly as needed for sleep       multivitamin w/minerals (THERA-VIT-M) tablet Take 1 tablet by mouth daily       nystatin (MYCOSTATIN) 339038 UNIT/GM external powder Apply topically daily       ondansetron (ZOFRAN) 4 MG tablet Take 4 mg by mouth every 6 hours as needed for nausea       senna (SENOKOT) 8.6 MG tablet Take 1 tablet by mouth 2 times daily as needed for constipation       simvastatin (ZOCOR) 20 MG tablet Take 20 mg by mouth At Bedtime       Vitamin D3 (CHOLECALCIFEROL) 25 mcg (1000 units) tablet Take 1 tablet by mouth daily       warfarin ANTICOAGULANT (COUMADIN) 5 MG tablet Take 5 mg (5 mg x 1 TABLET) BY MOUTH every Sun, Tue, Thu; AND TAKE 7.5 mg (5 mg x 1.5 TABLETS) all other days OR as directed by INR clinic.       pantoprazole (PROTONIX) 40 MG EC tablet Take 40 mg by mouth daily       No current facility-administered medications for this visit.     ROS:  10 point ROS of systems including Constitutional, Eyes, Respiratory, Cardiovascular, Gastroenterology, Genitourinary, Integumentary, Musculoskeletal, Psychiatric were all negative except for pertinent positives noted in my HPI.    Vitals:  /67   Pulse 90   Temp 97.5  F (36.4  C)   Resp 16   Ht 1.524 m (5')   Wt 61 kg (134 lb 6.4 oz)   SpO2 96%   BMI 26.25 kg/m    Exam:  GENERAL APPEARANCE:  Alert, in no distress, pleasant, cooperative  EYES: no  discharge or mattering on lids or lashes noted  ENT:  moist mucous membranes, hearing acuity intact  NECK: supple, symmetrical  RESP: no respiratory distress, Lung sounds clear, patient is on room air  CV:  rate and rhythm regular, no murmur. Edema none in bilateral lower extremities. VASCULAR: warm extremities without open areas.  ABDOMEN: normal bowel sounds, soft, nontender.  M/S:   Gait and station: unsafe without assistance, full leg cast on LLE  SKIN:  Inspection and palpation of skin and subcutaneous tissue: skin warm, dry and intact without rashes  NEURO: no facial asymmetry, no speech deficits and able to follow directions, moves all extremities symmetrically  PSYCH:  insight and judgement intact, memory mild impairment? affect and mood normal    Lab/Diagnostic data:  Recent labs in [facility] reviewed by me today.     ASSESSMENT/PLAN:  Left tibial plateau fracture   Sustained from a fall. s/p closed reduction and casting (5/10). Pain well controlled today.   - NWB LLE  -Continue PRN Tylenol 650 mg q4hr prn for pain control  - PT/OT  - F/up with ortho as scheduled on 5/23    History of PE  Patient with history of two PEs. On Coumadin. Bridged with Lovenox. INR 1.0 today.  - Continue Lovenox bridge to Coumadin   - Coumadin 10 mg tonight, 7.5 mg tomorrow then check INR on Friday.     Microcytic anemia  Hgb 8.2 on admission. Baseline ~8. Hx of GIB in Fall 2023.  - recheck HGB this week.     Hypertension  -130s.  - Continue Amlodipine   - Continue to monitor blood pressure and heart rate, adjust medications as needed.    Hx of neurocognitive disorder  Meningioma  Appears to have mild impairment. Currently lives in a town home independently. Calm and pleasant.   -Continue with Lexapro     Physical deconditioning  Secondary to recent hospitalization and underlying medical conditions.   --ongoing PT/OT for strengthening.     Electronically signed by:  CHRISTINE Carrasco CNP                    Sincerely,        CHRISTINE Carrasco CNP

## 2024-05-16 NOTE — PROGRESS NOTES
[facility] GERIATRICS    PRIMARY CARE PROVIDER AND CLINIC:  Physician No Ref-Primary, No address on file  Chief Complaint   Patient presents with    Hospital F/U      [place] Medical Record Number:  [id number]  Place of Service where encounter took place:  [facility] ([facility]) [[id number]]    Lawanda Crawford  is a 83 year old  (1940) female with a medical history of PE, hypertension and GIB. She was driving to [place] with her daughter where she had a fall and sustained a Left tibial plateau fracture. They drove back to [place] where she went to [facility] and is now s/p closed reduction. Had acute kidney injury that resolved. Chest pain with unremarkable work up. She was admitted to the above facility from  [facility]. Hospital stay 5/9/2024 through 5/14/2024..     Lawanda was visited today while in her room resting in bed.  She shares that she currently lives in a [facility] that is 1 level living, daughter lives several blocks away.  She has a dog whom she misses very much.  She has another daughter who lives in [place] which is where she was on her way to see when she had a fall.  She denies pain to the leg and reports that the cast that she has on now is far better than the one that she did previously.  She does not have chest pain or shortness of breath.  She is having regular bowel movements and no complaints of pain with urination.      CODE STATUS/ADVANCE DIRECTIVES DISCUSSION:  CPR/Full code   ALLERGIES:   Allergies   Allergen Reactions    Ofloxacin      light headedness      PAST MEDICAL HISTORY: No past medical history on file.   PAST SURGICAL HISTORY:   has no past surgical history on file.  FAMILY HISTORY: family history is not on file.  SOCIAL HISTORY:     Patient's living condition: lives alone    Post Discharge Medication Reconciliation Status:   MED REC REQUIRED  Post Medication Reconciliation Status: discharge medications reconciled and changed, per note/orders       Current Outpatient  Medications   Medication Sig Dispense Refill    acetaminophen (TYLENOL) 325 MG tablet Take 325-650 mg by mouth every 6 hours as needed for mild pain      amLODIPine (NORVASC) 10 MG tablet Take 10 mg by mouth daily      calcium carbonate (OS-VINCE) 500 MG TABS Take 1 tablet by mouth daily      enoxaparin ANTICOAGULANT (LOVENOX) 80 MG/0.8ML syringe Inject 0.7 mLs Subcutaneous daily      escitalopram (LEXAPRO) 20 MG tablet Take 20 mg by mouth daily      melatonin 3 MG tablet Take 3 mg by mouth nightly as needed for sleep      multivitamin w/minerals (THERA-VIT-M) tablet Take 1 tablet by mouth daily      nystatin (MYCOSTATIN) 019143 UNIT/GM external powder Apply topically daily      ondansetron (ZOFRAN) 4 MG tablet Take 4 mg by mouth every 6 hours as needed for nausea      senna (SENOKOT) 8.6 MG tablet Take 1 tablet by mouth 2 times daily as needed for constipation      simvastatin (ZOCOR) 20 MG tablet Take 20 mg by mouth At Bedtime      Vitamin D3 (CHOLECALCIFEROL) 25 mcg (1000 units) tablet Take 1 tablet by mouth daily      warfarin ANTICOAGULANT (COUMADIN) 5 MG tablet Take 5 mg (5 mg x 1 TABLET) BY MOUTH every Sun, Tue, Thu; AND TAKE 7.5 mg (5 mg x 1.5 TABLETS) all other days OR as directed by INR clinic.      pantoprazole (PROTONIX) 40 MG EC tablet Take 40 mg by mouth daily       No current facility-administered medications for this visit.     ROS:  10 point ROS of systems including Constitutional, Eyes, Respiratory, Cardiovascular, Gastroenterology, Genitourinary, Integumentary, Musculoskeletal, Psychiatric were all negative except for pertinent positives noted in my HPI.    Vitals:  /67   Pulse 90   Temp 97.5  F (36.4  C)   Resp 16   Ht 1.524 m (5')   Wt 61 kg (134 lb 6.4 oz)   SpO2 96%   BMI 26.25 kg/m    Exam:  GENERAL APPEARANCE:  Alert, in no distress, pleasant, cooperative  EYES: no discharge or mattering on lids or lashes noted  ENT:  moist mucous membranes, hearing acuity intact  NECK: supple,  symmetrical  RESP: no respiratory distress, Lung sounds clear, patient is on room air  CV:  rate and rhythm regular, no murmur. Edema none in bilateral lower extremities. VASCULAR: warm extremities without open areas.  ABDOMEN: normal bowel sounds, soft, nontender.  M/S:   Gait and station: unsafe without assistance, full leg cast on LLE  SKIN:  Inspection and palpation of skin and subcutaneous tissue: skin warm, dry and intact without rashes  NEURO: no facial asymmetry, no speech deficits and able to follow directions, moves all extremities symmetrically  PSYCH:  insight and judgement intact, memory mild impairment? affect and mood normal    Lab/Diagnostic data:  Recent labs in [facility] reviewed by me today.     ASSESSMENT/PLAN:  Left tibial plateau fracture   Sustained from a fall. s/p closed reduction and casting (5/10). Pain well controlled today.   - NWB LLE  -Continue PRN Tylenol 650 mg q4hr prn for pain control  - PT/OT  - F/up with ortho as scheduled on 5/23    History of PE  Patient with history of two PEs. On Coumadin. Bridged with Lovenox. INR 1.0 today.  - Continue Lovenox bridge to Coumadin   - Coumadin 10 mg tonight, 7.5 mg tomorrow then check INR on Friday.     Microcytic anemia  Hgb 8.2 on admission. Baseline ~8. Hx of GIB in Fall 2023.  - recheck HGB this week.     Hypertension  -130s.  - Continue Amlodipine   - Continue to monitor blood pressure and heart rate, adjust medications as needed.    Hx of neurocognitive disorder  Meningioma  Appears to have mild impairment. Currently lives in a town home independently. Calm and pleasant.   -Continue with Lexapro     Physical deconditioning  Secondary to recent hospitalization and underlying medical conditions.   --ongoing PT/OT for strengthening.     Electronically signed by:  CHRISTINE Carrasco CNP

## 2024-05-17 ENCOUNTER — TELEPHONE (OUTPATIENT)
Dept: GERIATRICS | Facility: CLINIC | Age: 84
End: 2024-05-17
Payer: MEDICARE

## 2024-05-17 NOTE — TELEPHONE ENCOUNTER
[facility] Geriatrics Triage Nurse INR     Provider: CHRISTINE Dominguez CNP   Facility: [facility]  Facility Type:  TCU    Caller: Mica  Call Back Number: [phone]  Reason for call: INR  Diagnosis/Goal: PE    Date INR New Dose/Orders   5/13 1.3 5 mg    5/14 1.3 No record of any Coumadin given   5/15 1.0 10 mg on 5/15, 7.5 mg on 5/16 5/17 1.9 7.5 mg daily        Heparin/Lovenox:  Yes; Lovenox 70 mg BID x 6 days  Currently on ABX?: No  Other interacting medication:  None  Missed or refused doses: No    Verbal Order/Direction given by Provider: Coumadin 7.5 mg po daily and recheck INR on 5/20.    Provider Giving Order:  CHRISITNE Dominguez CNP     Verbal Order given to: Mica Lees RN

## 2024-05-19 ENCOUNTER — LAB REQUISITION (OUTPATIENT)
Dept: LAB | Facility: CLINIC | Age: 84
End: 2024-05-19
Payer: MEDICARE

## 2024-05-19 DIAGNOSIS — I10 ESSENTIAL (PRIMARY) HYPERTENSION: ICD-10-CM

## 2024-05-19 DIAGNOSIS — D64.9 ANEMIA, UNSPECIFIED: ICD-10-CM

## 2024-05-20 ENCOUNTER — TELEPHONE (OUTPATIENT)
Dept: GERIATRICS | Facility: CLINIC | Age: 84
End: 2024-05-20

## 2024-05-20 LAB
ANION GAP SERPL CALCULATED.3IONS-SCNC: 12 MMOL/L (ref 7–15)
BASOPHILS # BLD AUTO: 0.1 10E3/UL (ref 0–0.2)
BASOPHILS NFR BLD AUTO: 1 %
BUN SERPL-MCNC: 9.2 MG/DL (ref 8–23)
CALCIUM SERPL-MCNC: 8.9 MG/DL (ref 8.8–10.2)
CHLORIDE SERPL-SCNC: 103 MMOL/L (ref 98–107)
CREAT SERPL-MCNC: 0.59 MG/DL (ref 0.51–0.95)
DEPRECATED HCO3 PLAS-SCNC: 25 MMOL/L (ref 22–29)
EGFRCR SERPLBLD CKD-EPI 2021: 89 ML/MIN/1.73M2
EOSINOPHIL # BLD AUTO: 0.3 10E3/UL (ref 0–0.7)
EOSINOPHIL NFR BLD AUTO: 4 %
ERYTHROCYTE [DISTWIDTH] IN BLOOD BY AUTOMATED COUNT: 23.4 % (ref 10–15)
FERRITIN SERPL-MCNC: 132 NG/ML (ref 11–328)
GLUCOSE SERPL-MCNC: 88 MG/DL (ref 70–99)
HCT VFR BLD AUTO: 30.6 % (ref 35–47)
HGB BLD-MCNC: 9.2 G/DL (ref 11.7–15.7)
IMM GRANULOCYTES # BLD: 0 10E3/UL
IMM GRANULOCYTES NFR BLD: 0 %
IRON BINDING CAPACITY (ROCHE): 350 UG/DL (ref 240–430)
IRON SATN MFR SERPL: 8 % (ref 15–46)
IRON SERPL-MCNC: 27 UG/DL (ref 37–145)
LYMPHOCYTES # BLD AUTO: 2.2 10E3/UL (ref 0.8–5.3)
LYMPHOCYTES NFR BLD AUTO: 33 %
MCH RBC QN AUTO: 23.7 PG (ref 26.5–33)
MCHC RBC AUTO-ENTMCNC: 30.1 G/DL (ref 31.5–36.5)
MCV RBC AUTO: 79 FL (ref 78–100)
MONOCYTES # BLD AUTO: 0.5 10E3/UL (ref 0–1.3)
MONOCYTES NFR BLD AUTO: 7 %
NEUTROPHILS # BLD AUTO: 3.6 10E3/UL (ref 1.6–8.3)
NEUTROPHILS NFR BLD AUTO: 55 %
NRBC # BLD AUTO: 0 10E3/UL
NRBC BLD AUTO-RTO: 0 /100
PLATELET # BLD AUTO: 674 10E3/UL (ref 150–450)
POTASSIUM SERPL-SCNC: 3.9 MMOL/L (ref 3.4–5.3)
RBC # BLD AUTO: 3.88 10E6/UL (ref 3.8–5.2)
SODIUM SERPL-SCNC: 140 MMOL/L (ref 135–145)
WBC # BLD AUTO: 6.6 10E3/UL (ref 4–11)

## 2024-05-20 PROCEDURE — 83550 IRON BINDING TEST: CPT | Mod: ORL | Performed by: NURSE PRACTITIONER

## 2024-05-20 PROCEDURE — 85025 COMPLETE CBC W/AUTO DIFF WBC: CPT | Mod: ORL | Performed by: NURSE PRACTITIONER

## 2024-05-20 PROCEDURE — P9604 ONE-WAY ALLOW PRORATED TRIP: HCPCS | Performed by: NURSE PRACTITIONER

## 2024-05-20 PROCEDURE — 36415 COLL VENOUS BLD VENIPUNCTURE: CPT | Performed by: NURSE PRACTITIONER

## 2024-05-20 PROCEDURE — 80048 BASIC METABOLIC PNL TOTAL CA: CPT | Mod: ORL | Performed by: NURSE PRACTITIONER

## 2024-05-20 PROCEDURE — 82728 ASSAY OF FERRITIN: CPT | Mod: ORL | Performed by: NURSE PRACTITIONER

## 2024-05-20 PROCEDURE — 80048 BASIC METABOLIC PNL TOTAL CA: CPT | Performed by: NURSE PRACTITIONER

## 2024-05-20 NOTE — TELEPHONE ENCOUNTER
[facility] Geriatrics Triage Nurse INR     Provider: CHRISTINE Dominguez CNP   Facility: [facility]   Facility Type:  TCU    Caller: Agus  Call Back Number: [phone]  Reason for call: INR  Diagnosis/Goal: PE    Date INR New Dose/Orders   5/15 1.0 10 mg 5/15, 7.5 mg 5/16 5/17 1.9 7.5 mg daily   5/20 2.6 Cont. same        Heparin/Lovenox:  Yes; Lovenox will be done today  Currently on ABX?: No  Other interacting medication:  None  Missed or refused doses: No    Verbal Order/Direction given by Provider: Discontinue Lovenox.  Coumadin 7.5 mg po daily and recheck INR on 5/23.    Provider Giving Order:  CHRISTINE Dominguez CNP     Verbal Order given to: Ginger Lees RN

## 2024-05-21 ENCOUNTER — TRANSITIONAL CARE UNIT VISIT (OUTPATIENT)
Dept: GERIATRICS | Facility: CLINIC | Age: 84
End: 2024-05-21
Payer: MEDICARE

## 2024-05-21 VITALS
OXYGEN SATURATION: 91 % | BODY MASS INDEX: 26.39 KG/M2 | SYSTOLIC BLOOD PRESSURE: 113 MMHG | WEIGHT: 134.4 LBS | HEIGHT: 60 IN | TEMPERATURE: 96.9 F | DIASTOLIC BLOOD PRESSURE: 57 MMHG | HEART RATE: 89 BPM | RESPIRATION RATE: 18 BRPM

## 2024-05-21 DIAGNOSIS — D64.9 ANEMIA, UNSPECIFIED TYPE: ICD-10-CM

## 2024-05-21 DIAGNOSIS — R53.81 PHYSICAL DECONDITIONING: ICD-10-CM

## 2024-05-21 DIAGNOSIS — S82.142D CLOSED FRACTURE OF LEFT TIBIAL PLATEAU WITH ROUTINE HEALING, SUBSEQUENT ENCOUNTER: ICD-10-CM

## 2024-05-21 DIAGNOSIS — Z86.711 HISTORY OF PULMONARY EMBOLISM: ICD-10-CM

## 2024-05-21 DIAGNOSIS — R53.81 PHYSICAL DEBILITY: ICD-10-CM

## 2024-05-21 DIAGNOSIS — D32.9 MENINGIOMA (H): ICD-10-CM

## 2024-05-21 DIAGNOSIS — Z47.89 AFTERCARE FOLLOWING SURGERY OF THE MUSCULOSKELETAL SYSTEM: Primary | ICD-10-CM

## 2024-05-21 DIAGNOSIS — R41.9 NEUROCOGNITIVE DISORDER: ICD-10-CM

## 2024-05-21 DIAGNOSIS — I10 BENIGN ESSENTIAL HYPERTENSION: ICD-10-CM

## 2024-05-21 PROCEDURE — 99309 SBSQ NF CARE MODERATE MDM 30: CPT | Performed by: NURSE PRACTITIONER

## 2024-05-21 NOTE — PROGRESS NOTES
[facility] GERIATRICS    Chief Complaint   Patient presents with    RECHECK     HPI:  Lawanda Crawford is a 83 year old  (1940), who is being seen today for an episodic care visit at: [facility] ([facility]) [[id number]]. Today's concern is:     Lawanda was visited today while in her room resting in bed.  She reports that her pain has been very minimal.  Therapy has been going well.  She is looking [place] [facility] [place] her orthopedic appointment that is scheduled later this week.  She has not noted any signs or symptoms of bleeding.  She is happy to hear that her INR is now within range.  Having regular bowel movements no complaints with urination.  No complaints of chest pain or shortness of breath.    Allergies, and PMH/PSH reviewed in [facility] today.  REVIEW OF SYSTEMS:  4 point ROS including Respiratory, CV, GI and , other than that noted in the HPI,  is negative    Objective:   /57   Pulse 89   Temp 96.9  F (36.1  C)   Resp 18   Ht 1.524 m (5')   Wt 61 kg (134 lb 6.4 oz)   SpO2 91%   BMI 26.25 kg/m    GENERAL APPEARANCE:  Alert, in no distress, pleasant, cooperative  EYES: no discharge or mattering on lids or lashes noted  ENT:  moist mucous membranes, hearing acuity intact  NECK: supple, symmetrical  RESP: no respiratory distress, Lung sounds clear, patient is on room air  CV:  rate and rhythm regular, no murmur. Edema none in bilateral lower extremities. VASCULAR: warm extremities without open areas.  ABDOMEN: normal bowel sounds, soft, nontender.  M/S:   Gait and station: unsafe without assistance, full leg cast on LLE  SKIN:  Inspection and palpation of skin and subcutaneous tissue: skin warm, dry and intact without rashes  NEURO: no facial asymmetry, no speech deficits and able to follow directions, moves all extremities symmetrically  PSYCH:  insight and judgement intact, memory mild impairment? affect and mood normal    CBC RESULTS:   Recent Labs    Lab Test 05/20/24  0720 05/16/24  0710   WBC 6.6 7.4   RBC 3.88 3.76*   HGB 9.2* 8.9*   HCT 30.6* 29.6*   MCV 79 79   MCH 23.7* 23.7*   MCHC 30.1* 30.1*   RDW 23.4* 22.5*   * 592*       Last Basic Metabolic Panel:  Recent Labs   Lab Test 05/20/24  0720 05/24/23  1737    134*   POTASSIUM 3.9 4.0   CHLORIDE 103 100   VINCE 8.9 9.3   CO2 25 22   BUN 9.2 12.4   CR 0.59 0.73   GLC 88 153*       Liver Function Studies -   Recent Labs   Lab Test 05/24/23  1737   PROTTOTAL 7.3   ALBUMIN 4.0   BILITOTAL 0.2   ALKPHOS 103   AST 23   ALT 23       TSH   Date Value Ref Range Status   12/15/2021 1.86 0.40 - 4.00 mU/L Final     Assessment/Plan:  Left tibial plateau fracture   Sustained from a fall. s/p closed reduction and casting (5/10). Pain well controlled today.   - NWB LLE  -Continue PRN Tylenol 650 mg q4hr prn for pain control  - PT/OT  - F/up with ortho as scheduled on 5/23    History of PE  Patient with history of two PEs. On Coumadin. Bridged with Lovenox. INR 2.6 today.  - Continue Lovenox bridge to Coumadin   - Coumadin 7.5 mg daily and recheck INR on Thursday    Microcytic anemia  Hgb 8.2 on admission. Baseline ~8. Hx of GIB in Fall 2023. HGB here at the TCU is 9.2.  Iron 27 and saturation index of 8 on 5/20/2024 therefore iron supplement was started.  - recheck HGB periodically throughout the stay.   - Started on ferrous sulfate.    Hypertension  -130s.  - Continue Amlodipine   - Continue to monitor blood pressure and heart rate, adjust medications as needed.    Hx of neurocognitive disorder  Meningioma  Appears to have mild impairment. Currently lives in a town home independently. Calm and pleasant.   -Continue with Lexapro     Physical deconditioning  Secondary to recent hospitalization and underlying medical conditions.   --ongoing PT/OT for strengthening.     MED REC REQUIRED  Post Medication Reconciliation Status: medication reconcilation previously completed during another office  visit      Electronically signed by: CHRISTINE Carrasco CNP

## 2024-05-21 NOTE — LETTER
5/21/2024        RE: Lawanda Crawford  [id number] [address] [id number]        [facility] GERIATRICS    Chief Complaint   Patient presents with     RECHECK     HPI:  Lawanda Crawford is a 83 year old  (1940), who is being seen today for an episodic care visit at: [facility] ([facility]) [[id number]]. Today's concern is:     Lawanda was visited today while in her room resting in bed.  She reports that her pain has been very minimal.  Therapy has been going well.  She is looking over her paperwork from [facility] in preparation for her orthopedic appointment that is scheduled later this week.  She has not noted any signs or symptoms of bleeding.  She is happy to hear that her INR is now within range.  Having regular bowel movements no complaints with urination.  No complaints of chest pain or shortness of breath.    Allergies, and PMH/PSH reviewed in EPIC today.  REVIEW OF SYSTEMS:  4 point ROS including Respiratory, CV, GI and , other than that noted in the HPI,  is negative    Objective:   /57   Pulse 89   Temp 96.9  F (36.1  C)   Resp 18   Ht 1.524 m (5')   Wt 61 kg (134 lb 6.4 oz)   SpO2 91%   BMI 26.25 kg/m    GENERAL APPEARANCE:  Alert, in no distress, pleasant, cooperative  EYES: no discharge or mattering on lids or lashes noted  ENT:  moist mucous membranes, hearing acuity intact  NECK: supple, symmetrical  RESP: no respiratory distress, Lung sounds clear, patient is on room air  CV:  rate and rhythm regular, no murmur. Edema none in bilateral lower extremities. VASCULAR: warm extremities without open areas.  ABDOMEN: normal bowel sounds, soft, nontender.  M/S:   Gait and station: unsafe without assistance, full leg cast on LLE  SKIN:  Inspection and palpation of skin and subcutaneous tissue: skin warm, dry and intact without rashes  NEURO: no facial asymmetry, no speech deficits and able to follow directions, moves all extremities symmetrically  PSYCH:  insight and  judgement intact, memory mild impairment? affect and mood normal    CBC RESULTS:   Recent Labs   Lab Test 05/20/24  0720 05/16/24  0710   WBC 6.6 7.4   RBC 3.88 3.76*   HGB 9.2* 8.9*   HCT 30.6* 29.6*   MCV 79 79   MCH 23.7* 23.7*   MCHC 30.1* 30.1*   RDW 23.4* 22.5*   * 592*       Last Basic Metabolic Panel:  Recent Labs   Lab Test 05/20/24  0720 05/24/23  1737    134*   POTASSIUM 3.9 4.0   CHLORIDE 103 100   VINCE 8.9 9.3   CO2 25 22   BUN 9.2 12.4   CR 0.59 0.73   GLC 88 153*       Liver Function Studies -   Recent Labs   Lab Test 05/24/23  1737   PROTTOTAL 7.3   ALBUMIN 4.0   BILITOTAL 0.2   ALKPHOS 103   AST 23   ALT 23       TSH   Date Value Ref Range Status   12/15/2021 1.86 0.40 - 4.00 mU/L Final     Assessment/Plan:  Left tibial plateau fracture   Sustained from a fall. s/p closed reduction and casting (5/10). Pain well controlled today.   - NWB LLE  -Continue PRN Tylenol 650 mg q4hr prn for pain control  - PT/OT  - F/up with ortho as scheduled on 5/23    History of PE  Patient with history of two PEs. On Coumadin. Bridged with Lovenox. INR 2.6 today.  - Continue Lovenox bridge to Coumadin   - Coumadin 7.5 mg daily and recheck INR on Thursday    Microcytic anemia  Hgb 8.2 on admission. Baseline ~8. Hx of GIB in Fall 2023. HGB here at the TCU is 9.2.  Iron 27 and saturation index of 8 on 5/20/2024 therefore iron supplement was started.  - recheck HGB periodically throughout the stay.   - Started on ferrous sulfate.    Hypertension  -130s.  - Continue Amlodipine   - Continue to monitor blood pressure and heart rate, adjust medications as needed.    Hx of neurocognitive disorder  Meningioma  Appears to have mild impairment. Currently lives in a town home independently. Calm and pleasant.   -Continue with Lexapro     Physical deconditioning  Secondary to recent hospitalization and underlying medical conditions.   --ongoing PT/OT for strengthening.     MED REC REQUIRED  Post Medication  Reconciliation Status: medication reconcilation previously completed during another office visit      Electronically signed by: CHRISTINE Carrasco CNP             Sincerely,        CHRISTINE Carrasco CNP       done

## 2024-05-24 ENCOUNTER — TRANSITIONAL CARE UNIT VISIT (OUTPATIENT)
Dept: GERIATRICS | Facility: CLINIC | Age: 84
End: 2024-05-24
Payer: MEDICARE

## 2024-05-24 VITALS
HEART RATE: 87 BPM | RESPIRATION RATE: 18 BRPM | TEMPERATURE: 97.5 F | DIASTOLIC BLOOD PRESSURE: 69 MMHG | BODY MASS INDEX: 26.39 KG/M2 | WEIGHT: 134.4 LBS | HEIGHT: 60 IN | OXYGEN SATURATION: 94 % | SYSTOLIC BLOOD PRESSURE: 127 MMHG

## 2024-05-24 DIAGNOSIS — D64.9 ANEMIA, UNSPECIFIED TYPE: ICD-10-CM

## 2024-05-24 DIAGNOSIS — Z86.711 HISTORY OF PULMONARY EMBOLISM: ICD-10-CM

## 2024-05-24 DIAGNOSIS — I10 BENIGN ESSENTIAL HYPERTENSION: ICD-10-CM

## 2024-05-24 DIAGNOSIS — R41.9 NEUROCOGNITIVE DISORDER: ICD-10-CM

## 2024-05-24 DIAGNOSIS — S82.142D CLOSED FRACTURE OF LEFT TIBIAL PLATEAU WITH ROUTINE HEALING, SUBSEQUENT ENCOUNTER: ICD-10-CM

## 2024-05-24 DIAGNOSIS — Z47.89 AFTERCARE FOLLOWING SURGERY OF THE MUSCULOSKELETAL SYSTEM: Primary | ICD-10-CM

## 2024-05-24 DIAGNOSIS — R53.81 PHYSICAL DECONDITIONING: ICD-10-CM

## 2024-05-24 PROCEDURE — 99309 SBSQ NF CARE MODERATE MDM 30: CPT | Performed by: NURSE PRACTITIONER

## 2024-05-24 NOTE — PROGRESS NOTES
[facility] GERIATRICS    Chief Complaint   Patient presents with    RECHECK     HPI:  Lawanda Crawford is a 83 year old  (1940), who is being seen today for an episodic care visit at: [facility] ([facility]) [[id number]]. Today's concern is:     Lawanda was visited today while in her room resting in bed.  She reports that her pain has been very minimal.  Therapy has been going well.  She missed her appt yesterday due to transportation.  She has not noted any signs or symptoms of bleeding.  Having regular bowel movements no complaints with urination.  No complaints of chest pain or shortness of breath.    Allergies, and PMH/PSH reviewed in EPIC today.  REVIEW OF SYSTEMS:  4 point ROS including Respiratory, CV, GI and , other than that noted in the HPI,  is negative    Objective:   /69   Pulse 87   Temp 97.5  F (36.4  C)   Resp 18   Ht 1.524 m (5')   Wt 61 kg (134 lb 6.4 oz)   SpO2 94%   BMI 26.25 kg/m    GENERAL APPEARANCE:  Alert, in no distress, pleasant, cooperative  EYES: no discharge or mattering on lids or lashes noted  ENT:  moist mucous membranes, hearing acuity intact  NECK: supple, symmetrical  RESP: no respiratory distress, Lung sounds clear, patient is on room air  CV:  rate and rhythm regular, no murmur. Edema none in bilateral lower extremities. VASCULAR: warm extremities without open areas.  ABDOMEN: normal bowel sounds, soft, nontender.  M/S:   Gait and station: unsafe without assistance, full leg cast on LLE  SKIN:  Inspection and palpation of skin and subcutaneous tissue: skin warm, dry and intact without rashes  NEURO: no facial asymmetry, no speech deficits and able to follow directions, moves all extremities symmetrically  PSYCH:  insight and judgement intact, memory mild impairment? affect and mood normal    CBC RESULTS:   Recent Labs   Lab Test 05/20/24  0720 05/16/24  0710   WBC 6.6 7.4   RBC 3.88 3.76*   HGB 9.2* 8.9*   HCT 30.6* 29.6*   MCV 79 79   MCH 23.7*  23.7*   MCHC 30.1* 30.1*   RDW 23.4* 22.5*   * 592*       Last Basic Metabolic Panel:  Recent Labs   Lab Test 05/20/24  0720 05/24/23  1737    134*   POTASSIUM 3.9 4.0   CHLORIDE 103 100   VINCE 8.9 9.3   CO2 25 22   BUN 9.2 12.4   CR 0.59 0.73   GLC 88 153*       Liver Function Studies -   Recent Labs   Lab Test 05/24/23  1737   PROTTOTAL 7.3   ALBUMIN 4.0   BILITOTAL 0.2   ALKPHOS 103   AST 23   ALT 23       TSH   Date Value Ref Range Status   12/15/2021 1.86 0.40 - 4.00 mU/L Final     Assessment/Plan:  Left tibial plateau fracture   Sustained from a fall. s/p closed reduction and casting (5/10). Pain well controlled today.   - NWB LLE  -Continue PRN Tylenol 650 mg q4hr prn for pain control  - PT/OT  - F/up with ortho as rescheduled next week.     History of PE  Patient with history of two PEs. On Coumadin. Bridged with Lovenox. INR 2.8 today.  - Coumadin 7.5 mg daily and recheck INR on Monday    Microcytic anemia  Hgb 8.2 on admission. Baseline ~8. Hx of GIB in Fall 2023. HGB here at the TCU is 9.2.  Iron 27 and saturation index of 8 on 5/20/2024 therefore iron supplement was started.  - recheck HGB periodically throughout the stay.   - Started on ferrous sulfate.    Hypertension  -130s.  - Continue Amlodipine   - Continue to monitor blood pressure and heart rate, adjust medications as needed.    Hx of neurocognitive disorder  Meningioma  Appears to have mild impairment. Currently lives in a town home independently. Calm and pleasant.   -Continue with Lexapro     Physical deconditioning  Secondary to recent hospitalization and underlying medical conditions.   --ongoing PT/OT for strengthening.     MED REC REQUIRED  Post Medication Reconciliation Status: medication reconcilation previously completed during another office visit      Electronically signed by: CHRISTINE Carrasco CNP

## 2024-05-24 NOTE — LETTER
5/24/2024        RE: Lwaanda Crawford  [address] [address] [address]        [facility] GERIATRICS    Chief Complaint   Patient presents with     RECHECK     HPI:  Lawanda Crawford is a 83 year old  (1940), who is being seen today for an episodic care visit at: [facility] ([facility]) [[id number]]. Today's concern is:     Lawanda was visited today while in her room resting in bed.  She reports that her pain has been very minimal.  Therapy has been going well.  She missed her appt yesterday due to transportation.  She has not noted any signs or symptoms of bleeding.  Having regular bowel movements no complaints with urination.  No complaints of chest pain or shortness of breath.    Allergies, and PMH/PSH reviewed in EPIC today.  REVIEW OF SYSTEMS:  4 point ROS including Respiratory, CV, GI and , other than that noted in the HPI,  is negative    Objective:   /69   Pulse 87   Temp 97.5  F (36.4  C)   Resp 18   Ht 1.524 m (5')   Wt 61 kg (134 lb 6.4 oz)   SpO2 94%   BMI 26.25 kg/m    GENERAL APPEARANCE:  Alert, in no distress, pleasant, cooperative  EYES: no discharge or mattering on lids or lashes noted  ENT:  moist mucous membranes, hearing acuity intact  NECK: supple, symmetrical  RESP: no respiratory distress, Lung sounds clear, patient is on room air  CV:  rate and rhythm regular, no murmur. Edema none in bilateral lower extremities. VASCULAR: warm extremities without open areas.  ABDOMEN: normal bowel sounds, soft, nontender.  M/S:   Gait and station: unsafe without assistance, full leg cast on LLE  SKIN:  Inspection and palpation of skin and subcutaneous tissue: skin warm, dry and intact without rashes  NEURO: no facial asymmetry, no speech deficits and able to follow directions, moves all extremities symmetrically  PSYCH:  insight and judgement intact, memory mild impairment? affect and mood normal    CBC RESULTS:   Recent Labs   Lab Test 05/20/24  0720  05/16/24  0710   WBC 6.6 7.4   RBC 3.88 3.76*   HGB 9.2* 8.9*   HCT 30.6* 29.6*   MCV 79 79   MCH 23.7* 23.7*   MCHC 30.1* 30.1*   RDW 23.4* 22.5*   * 592*       Last Basic Metabolic Panel:  Recent Labs   Lab Test 05/20/24  0720 05/24/23  1737    134*   POTASSIUM 3.9 4.0   CHLORIDE 103 100   VINCE 8.9 9.3   CO2 25 22   BUN 9.2 12.4   CR 0.59 0.73   GLC 88 153*       Liver Function Studies -   Recent Labs   Lab Test 05/24/23  1737   PROTTOTAL 7.3   ALBUMIN 4.0   BILITOTAL 0.2   ALKPHOS 103   AST 23   ALT 23       TSH   Date Value Ref Range Status   12/15/2021 1.86 0.40 - 4.00 mU/L Final     Assessment/Plan:  Left tibial plateau fracture   Sustained from a fall. s/p closed reduction and casting (5/10). Pain well controlled today.   - NWB LLE  -Continue PRN Tylenol 650 mg q4hr prn for pain control  - PT/OT  - F/up with ortho as rescheduled next week.     History of PE  Patient with history of two PEs. On Coumadin. Bridged with Lovenox. INR 2.8 today.  - Coumadin 7.5 mg daily and recheck INR on Monday    Microcytic anemia  Hgb 8.2 on admission. Baseline ~8. Hx of GIB in Fall 2023. HGB here at the TCU is 9.2.  Iron 27 and saturation index of 8 on 5/20/2024 therefore iron supplement was started.  - recheck HGB periodically throughout the stay.   - Started on ferrous sulfate.    Hypertension  -130s.  - Continue Amlodipine   - Continue to monitor blood pressure and heart rate, adjust medications as needed.    Hx of neurocognitive disorder  Meningioma  Appears to have mild impairment. Currently lives in a town home independently. Calm and pleasant.   -Continue with Lexapro     Physical deconditioning  Secondary to recent hospitalization and underlying medical conditions.   --ongoing PT/OT for strengthening.     MED REC REQUIRED  Post Medication Reconciliation Status: medication reconcilation previously completed during another office visit      Electronically signed by: CHRISTINE Carrasco CNP                Sincerely,        CHRISTINE Carrasco CNP

## 2024-05-27 ENCOUNTER — TELEPHONE (OUTPATIENT)
Dept: GERIATRICS | Facility: CLINIC | Age: 84
End: 2024-05-27
Payer: MEDICARE

## 2024-05-27 NOTE — TELEPHONE ENCOUNTER
[facility] TELEPHONE ENCOUNTER    Chief Complaint   Patient presents with    INR RESULTS       Lawanda Crawford is a 83 year old  (1940),Nurse called today to report: INR results today 3.8. Remains on coumadin for PE diagnosis. Last INR 2.8 on 5/23    ASSESSMENT/PLAN    HOLD coumadin tonight.   Recheck tomorrow 5/28    Electronically signed by:   CHRISTINE Blakely CNP

## 2024-05-28 ENCOUNTER — TELEPHONE (OUTPATIENT)
Dept: GERIATRICS | Facility: CLINIC | Age: 84
End: 2024-05-28
Payer: MEDICARE

## 2024-05-28 ENCOUNTER — TRANSITIONAL CARE UNIT VISIT (OUTPATIENT)
Dept: GERIATRICS | Facility: CLINIC | Age: 84
End: 2024-05-28
Payer: MEDICARE

## 2024-05-28 VITALS
TEMPERATURE: 97.5 F | OXYGEN SATURATION: 94 % | HEART RATE: 87 BPM | SYSTOLIC BLOOD PRESSURE: 127 MMHG | DIASTOLIC BLOOD PRESSURE: 69 MMHG | RESPIRATION RATE: 18 BRPM

## 2024-05-28 DIAGNOSIS — D64.9 ANEMIA, UNSPECIFIED TYPE: ICD-10-CM

## 2024-05-28 DIAGNOSIS — Z47.89 AFTERCARE FOLLOWING SURGERY OF THE MUSCULOSKELETAL SYSTEM: Primary | ICD-10-CM

## 2024-05-28 DIAGNOSIS — R53.81 PHYSICAL DECONDITIONING: ICD-10-CM

## 2024-05-28 DIAGNOSIS — R41.9 NEUROCOGNITIVE DISORDER: ICD-10-CM

## 2024-05-28 DIAGNOSIS — S82.142D CLOSED FRACTURE OF LEFT TIBIAL PLATEAU WITH ROUTINE HEALING, SUBSEQUENT ENCOUNTER: ICD-10-CM

## 2024-05-28 DIAGNOSIS — Z86.711 HISTORY OF PULMONARY EMBOLISM: ICD-10-CM

## 2024-05-28 DIAGNOSIS — I10 BENIGN ESSENTIAL HYPERTENSION: ICD-10-CM

## 2024-05-28 PROCEDURE — 99309 SBSQ NF CARE MODERATE MDM 30: CPT | Performed by: NURSE PRACTITIONER

## 2024-05-28 NOTE — PROGRESS NOTES
[facility] GERIATRICS    Chief Complaint   Patient presents with    RECHECK     HPI:  Lawanda Crawford is a 83 year old  (1940), who is being seen today for an episodic care visit at: [facility] ([facility]) [[id number]]. Today's concern is:     Lawanda was visited today while in her room resting in bed.  She reports that her pain has been very minimal.  Therapy has been going well.  She missed her appointment last week due to transportation.  She has not noted any signs or symptoms of bleeding.  Having regular bowel movements no complaints with urination.  No complaints of chest pain or shortness of breath.    Allergies, and PMH/PSH reviewed in EPIC today.  REVIEW OF SYSTEMS:  4 point ROS including Respiratory, CV, GI and , other than that noted in the HPI,  is negative    Objective:   /69   Pulse 87   Temp 97.5  F (36.4  C)   Resp 18   SpO2 94%   GENERAL APPEARANCE:  Alert, in no distress, pleasant, cooperative  EYES: no discharge or mattering on lids or lashes noted  ENT:  moist mucous membranes, hearing acuity intact  NECK: supple, symmetrical  RESP: no respiratory distress, Lung sounds clear, patient is on room air  CV:  rate and rhythm regular, no murmur. Edema none in bilateral lower extremities. VASCULAR: warm extremities without open areas.  ABDOMEN: normal bowel sounds, soft, nontender.  M/S:   Gait and station: unsafe without assistance, full leg cast on LLE  SKIN:  Inspection and palpation of skin and subcutaneous tissue: skin warm, dry and intact without rashes  NEURO: no facial asymmetry, no speech deficits and able to follow directions, moves all extremities symmetrically  PSYCH:  insight and judgement intact, memory mild impairment? affect and mood normal    CBC RESULTS:   Recent Labs   Lab Test 05/20/24  0720 05/16/24  0710   WBC 6.6 7.4   RBC 3.88 3.76*   HGB 9.2* 8.9*   HCT 30.6* 29.6*   MCV 79 79   MCH 23.7* 23.7*   MCHC 30.1* 30.1*   RDW 23.4* 22.5*   * 592*        Last Basic Metabolic Panel:  Recent Labs   Lab Test 05/20/24  0720 05/24/23  1737    134*   POTASSIUM 3.9 4.0   CHLORIDE 103 100   VINCE 8.9 9.3   CO2 25 22   BUN 9.2 12.4   CR 0.59 0.73   GLC 88 153*       Liver Function Studies -   Recent Labs   Lab Test 05/24/23  1737   PROTTOTAL 7.3   ALBUMIN 4.0   BILITOTAL 0.2   ALKPHOS 103   AST 23   ALT 23       TSH   Date Value Ref Range Status   12/15/2021 1.86 0.40 - 4.00 mU/L Final     Assessment/Plan:  Left tibial plateau fracture   Sustained from a fall. s/p closed reduction and casting (5/10). Pain well controlled today.   - NWB LLE  -Continue PRN Tylenol 650 mg q4hr prn for pain control  - PT/OT  - F/up with ortho as rescheduled later this week.    History of PE  Patient with history of two PEs. On Coumadin. Bridged with Lovenox. INR 3.3 today, coumadin held yesterday for INR of 3.8.  - Coumadin 2 mg tonight and 5 mg tomorrow, recheck INR on Wednesday. (Home dose of coumadin is 5 mg alternating with 7.5 mg)    Microcytic anemia  Hgb 8.2 on admission. Baseline ~8. Hx of GIB in Fall 2023. HGB here at the TCU is 9.2.  Iron 27 and saturation index of 8 on 5/20/2024 therefore iron supplement was started.  - recheck HGB periodically throughout the stay.   - Started on ferrous sulfate here at the TCU    Hypertension  -130s.  - Continue Amlodipine   - Continue to monitor blood pressure and heart rate, adjust medications as needed.    Hx of neurocognitive disorder  Meningioma  Appears to have mild impairment. Currently lives in a town home independently. Calm and pleasant. SLUMS 13/30, CPT to be completed later this week.  She currently lives at home independently so the scores indicate that she will need more services after discharge.  -Continue with Lexapro     Physical deconditioning  Secondary to recent hospitalization and underlying medical conditions.   --ongoing PT/OT for strengthening.     MED REC REQUIRED  Post Medication Reconciliation Status:  medication reconcilation previously completed during another office visit      Electronically signed by: CHRISTINE Carrasco CNP

## 2024-05-28 NOTE — LETTER
5/28/2024        RE: Lawanda Crawford  [address] [facility] [address]        [facility] GERIATRICS    Chief Complaint   Patient presents with     RECHECK     HPI:  Lawanda Crawford is a 83 year old  (1940), who is being seen today for an episodic care visit at: [facility] ([facility]) [[id number]]. Today's concern is:     Lawanda was visited today while in her room resting in bed.  She reports that her pain has been very minimal.  Therapy has been going well.  She missed her appointment last week due to transportation.  She has not noted any signs or symptoms of bleeding.  Having regular bowel movements no complaints with urination.  No complaints of chest pain or shortness of breath.    Allergies, and PMH/PSH reviewed in EPIC today.  REVIEW OF SYSTEMS:  4 point ROS including Respiratory, CV, GI and , other than that noted in the HPI,  is negative    Objective:   /69   Pulse 87   Temp 97.5  F (36.4  C)   Resp 18   SpO2 94%   GENERAL APPEARANCE:  Alert, in no distress, pleasant, cooperative  EYES: no discharge or mattering on lids or lashes noted  ENT:  moist mucous membranes, hearing acuity intact  NECK: supple, symmetrical  RESP: no respiratory distress, Lung sounds clear, patient is on room air  CV:  rate and rhythm regular, no murmur. Edema none in bilateral lower extremities. VASCULAR: warm extremities without open areas.  ABDOMEN: normal bowel sounds, soft, nontender.  M/S:   Gait and station: unsafe without assistance, full leg cast on LLE  SKIN:  Inspection and palpation of skin and subcutaneous tissue: skin warm, dry and intact without rashes  NEURO: no facial asymmetry, no speech deficits and able to follow directions, moves all extremities symmetrically  PSYCH:  insight and judgement intact, memory mild impairment? affect and mood normal    CBC RESULTS:   Recent Labs   Lab Test 05/20/24  0720 05/16/24  0710   WBC 6.6 7.4   RBC 3.88 3.76*   HGB 9.2* 8.9*    HCT 30.6* 29.6*   MCV 79 79   MCH 23.7* 23.7*   MCHC 30.1* 30.1*   RDW 23.4* 22.5*   * 592*       Last Basic Metabolic Panel:  Recent Labs   Lab Test 05/20/24  0720 05/24/23  1737    134*   POTASSIUM 3.9 4.0   CHLORIDE 103 100   VINCE 8.9 9.3   CO2 25 22   BUN 9.2 12.4   CR 0.59 0.73   GLC 88 153*       Liver Function Studies -   Recent Labs   Lab Test 05/24/23  1737   PROTTOTAL 7.3   ALBUMIN 4.0   BILITOTAL 0.2   ALKPHOS 103   AST 23   ALT 23       TSH   Date Value Ref Range Status   12/15/2021 1.86 0.40 - 4.00 mU/L Final     Assessment/Plan:  Left tibial plateau fracture   Sustained from a fall. s/p closed reduction and casting (5/10). Pain well controlled today.   - NWB LLE  -Continue PRN Tylenol 650 mg q4hr prn for pain control  - PT/OT  - F/up with ortho as rescheduled later this week.    History of PE  Patient with history of two PEs. On Coumadin. Bridged with Lovenox. INR 3.3 today, coumadin held yesterday for INR of 3.8.  - Coumadin 2 mg tonight and 5 mg tomorrow, recheck INR on Wednesday. (Home dose of coumadin is 5 mg alternating with 7.5 mg)    Microcytic anemia  Hgb 8.2 on admission. Baseline ~8. Hx of GIB in Fall 2023. HGB here at the TCU is 9.2.  Iron 27 and saturation index of 8 on 5/20/2024 therefore iron supplement was started.  - recheck HGB periodically throughout the stay.   - Started on ferrous sulfate here at the TCU    Hypertension  -130s.  - Continue Amlodipine   - Continue to monitor blood pressure and heart rate, adjust medications as needed.    Hx of neurocognitive disorder  Meningioma  Appears to have mild impairment. Currently lives in a town home independently. Calm and pleasant. SLUMS 13/30, CPT to be completed later this week.  She currently lives at home independently so the scores indicate that she will need more services after discharge.  -Continue with Lexapro     Physical deconditioning  Secondary to recent hospitalization and underlying medical conditions.    --ongoing PT/OT for strengthening.     MED REC REQUIRED  Post Medication Reconciliation Status: medication reconcilation previously completed during another office visit      Electronically signed by: CHRISTINE Carrasco CNP                   Sincerely,        CHRISTINE Carrasco CNP

## 2024-05-28 NOTE — TELEPHONE ENCOUNTER
[facility]     Provider: CHRISTINE Dominguez CNP   Facility: [facility]   Facility Type:  TCU    Caller: Carlie   Call Back Number: [phone]  Reason for call: INR  Diagnosis/Goal: PE    Date INR New Dose/Orders   5/20 2.6 7.5mg every day, lovenox was dc'd    5/23 2.8 7.5mg every day    5/27 3.8 held   5/28 3.3         Heparin/Lovenox:  No  Currently on ABX?: No  Other interacting medication:  None  Missed or refused doses: No    Verbal Order/Direction given by Provider: NP will address in house          Bonita Rodriguez RN

## 2024-05-30 ENCOUNTER — TRANSITIONAL CARE UNIT VISIT (OUTPATIENT)
Dept: GERIATRICS | Facility: CLINIC | Age: 84
End: 2024-05-30
Payer: MEDICARE

## 2024-05-30 ENCOUNTER — TELEPHONE (OUTPATIENT)
Dept: GERIATRICS | Facility: CLINIC | Age: 84
End: 2024-05-30

## 2024-05-30 VITALS
OXYGEN SATURATION: 95 % | SYSTOLIC BLOOD PRESSURE: 101 MMHG | RESPIRATION RATE: 18 BRPM | DIASTOLIC BLOOD PRESSURE: 60 MMHG | HEIGHT: 60 IN | BODY MASS INDEX: 26.39 KG/M2 | TEMPERATURE: 97.5 F | HEART RATE: 86 BPM | WEIGHT: 134.4 LBS

## 2024-05-30 DIAGNOSIS — S82.142D CLOSED FRACTURE OF LEFT TIBIAL PLATEAU WITH ROUTINE HEALING, SUBSEQUENT ENCOUNTER: Primary | ICD-10-CM

## 2024-05-30 DIAGNOSIS — D64.9 ANEMIA, UNSPECIFIED TYPE: ICD-10-CM

## 2024-05-30 DIAGNOSIS — Z86.711 HISTORY OF PULMONARY EMBOLISM: ICD-10-CM

## 2024-05-30 DIAGNOSIS — I10 ESSENTIAL HYPERTENSION: ICD-10-CM

## 2024-05-30 PROCEDURE — 99304 1ST NF CARE SF/LOW MDM 25: CPT | Performed by: INTERNAL MEDICINE

## 2024-05-30 NOTE — PROGRESS NOTES
[facility] GERIATRICS    PRIMARY CARE PROVIDER AND CLINIC:  Physician No Ref-Primary, No address on file    Chief Complaint   Patient presents with    Hospital F/U      [place] Medical Record Number:  [id number]  Place of Service where encounter took place:  [facility] ([facility])    Lawanda Crawford  is a 83 year old  (1940), admitted to the above facility [place]  [facility] Hospital stay 5/9/24 through 5/14/24..     Hospital course was reviewed by me, is as per the hospital discharge summary and nurse practitioner note.    Patient is a past medical history of hypertension, GI bleed, pulmonary embolism on chronic warfarin, anemia, neurocognitive disorder.      She suffered a left tibial plateau fracture following a fall.  She underwent closed reduction of the fracture.  Course complicated by acute kidney injury, resolved.  She did experience chest pain during hospitalization which was felt to be chest wall in etiology.  CT scan of the chest revealed no PE.  She was discharged on Lovenox bridging with warfarin.  Hemoglobin 8.2 on admission, hemoglobin 9.2 on 5/20/2024 with MCV of 79  Iron studies consistent with iron deficiency.    Patient's left leg currently is in a cast.  She is nonweightbearing.  Pain has been under fair control.  She is having regular bowel movements.  Denies cough, chest pain, shortness of breath.    CODE STATUS/ADVANCE DIRECTIVES DISCUSSION:  Prior  CPR/Full code   ALLERGIES:   Allergies   Allergen Reactions    Ofloxacin      light headedness      PAST MEDICAL HISTORY: As noted above  PAST SURGICAL HISTORY:   has no past surgical history on file.  FAMILY HISTORY: family history is not on file.  SOCIAL HISTORY:     Patient's living condition: lives alone      Current Outpatient Medications   Medication Sig Dispense Refill    acetaminophen (TYLENOL) 325 MG tablet Take 325-650 mg by mouth every 6 hours as needed for mild pain      amLODIPine (NORVASC) 10 MG tablet Take 10 mg  by mouth daily      calcium carbonate (OS-VINCE) 500 MG TABS Take 1 tablet by mouth daily      escitalopram (LEXAPRO) 20 MG tablet Take 20 mg by mouth daily      melatonin 3 MG tablet Take 3 mg by mouth nightly as needed for sleep      multivitamin w/minerals (THERA-VIT-M) tablet Take 1 tablet by mouth daily      nystatin (MYCOSTATIN) 502428 UNIT/GM external powder Apply topically daily      ondansetron (ZOFRAN) 4 MG tablet Take 4 mg by mouth every 6 hours as needed for nausea      senna (SENOKOT) 8.6 MG tablet Take 1 tablet by mouth 2 times daily as needed for constipation      simvastatin (ZOCOR) 20 MG tablet Take 20 mg by mouth At Bedtime      Vitamin D3 (CHOLECALCIFEROL) 25 mcg (1000 units) tablet Take 1 tablet by mouth daily      warfarin ANTICOAGULANT (COUMADIN) 5 MG tablet Take 5 mg (5 mg x 1 TABLET) BY MOUTH every Sun, Tue, Thu; AND TAKE 7.5 mg (5 mg x 1.5 TABLETS) all other days OR as directed by INR clinic.       No current facility-administered medications for this visit.       ROS:  10 point ROS of systems including Constitutional, Eyes, Respiratory, Cardiovascular, Gastroenterology, Genitourinary, Integumentary, Musculoskeletal, Psychiatric were all negative except for pertinent positives noted in my HPI.    Vitals:  /60   Pulse 86   Temp 97.5  F (36.4  C)   Resp 18   Ht 1.524 m (5')   Wt 61 kg (134 lb 6.4 oz)   SpO2 95%   BMI 26.25 kg/m    Exam:  Very pleasant, well-appearing female, lying comfortably in bed.  HEENT: Oral mucosa moist  Lungs clear  CV regular rhythm  Abdomen soft, nontender, nondistended  Extremities, left leg and cast.  No right lower extremity edema  Neuro: Fully oriented.  Cranial nerves intact    Lab/Diagnostic data:  Most Recent 3 CBC's:  Recent Labs   Lab Test 05/20/24  0720 05/16/24  0710 10/31/23  0749 05/24/23  1737   WBC 6.6 7.4  --  8.8   HGB 9.2* 8.9* 8.7* 12.4   MCV 79 79  --  84   * 592*  --  415     Most Recent 3 BMP's:  Recent Labs   Lab Test  05/20/24  0720 05/24/23  1737 01/17/22  0705    134* 139   POTASSIUM 3.9 4.0 3.8   CHLORIDE 103 100 108   CO2 25 22 25   BUN 9.2 12.4 12   CR 0.59 0.73 0.70   ANIONGAP 12 12 6   VINCE 8.9 9.3 8.5   GLC 88 153* 91     Most Recent 2 LFT's:  Recent Labs   Lab Test 05/24/23  1737   AST 23   ALT 23   ALKPHOS 103   BILITOTAL 0.2     Most Recent TSH and T4:  Recent Labs   Lab Test 12/15/21  0925   TSH 1.86     Most Recent Anemia Panel:  Recent Labs   Lab Test 05/20/24  0720   WBC 6.6   HGB 9.2*   HCT 30.6*   MCV 79   *   IRON 27*   IRONSAT 8*      SAROJ 132       ASSESSMENT/PLAN:      Left tibial plateau fracture following a fall  Pain controlled  Plan: Nonweightbearing left leg.  Orthopedics follow-up    History of pulmonary embolism x 2 on chronic warfarin  Plan continue resumed warfarin, monitor INR    Microcytic anemia, stable.  History of GI bleed 2023.  No history of recent bleeding  Plan: Consider oral iron    Hypertension  Stable on prior to admission amlodipine  Plan: Monitor blood pressure, avoid hypotension    History of neurocognitive disorder  Meningioma  Mental status appears intact this morning  Plan: Continue prior to mission Lexapro, monitor mental and functional status      Jai Harris MD

## 2024-05-30 NOTE — TELEPHONE ENCOUNTER
[facility] Geriatrics Triage Nurse INR     Provider: CHRISITNE Dominguez CNP   Facility: [facility]   Facility Type:  TCU    Caller: Tara  Call Back Number: [phone]  Reason for call: INR  Diagnosis/Goal: PE    Date INR New Dose/Orders   5/23 2.8 7.5 mg daily   5/27 3.8 Held   5/28 3.3 2 mg on 5/28, 5 mg on 5/29 5/30 3.0 4 mg on 5/30, 5/31 and 5 mg on 6/1, 6/2        Heparin/Lovenox:  No  Currently on ABX?: No  Other interacting medication:  None  Missed or refused doses: No    Verbal Order/Direction given by Provider: Coumadin 4 mg po on 5/30, 5/31 and 5 mg po on 6/1, 6/2.  Recheck INR on 6/3.    Provider Giving Order:  CHRISTINE Dominguez CNP     Verbal Order given to: Tara Lees RN

## 2024-05-30 NOTE — LETTER
5/30/2024        RE: Lawanda Crawford  [address] [facility] [address]        [facility] GERIATRICS    PRIMARY CARE PROVIDER AND CLINIC:  Physician No Ref-Primary, No address on file    Chief Complaint   Patient presents with     Hospital F/U      [place] Medical Record Number:  [id number]  Place of Service where encounter took place:  [facility] ([facility])    Lawanda Crawford  is a 83 year old  (1940), admitted to the above facility from  [facility]. Hospital stay 5/9/24 through 5/14/24..     Hospital course was reviewed by me, is as per the hospital discharge summary and nurse practitioner note.    Patient is a past medical history of hypertension, GI bleed, pulmonary embolism on chronic warfarin, anemia, neurocognitive disorder.      She suffered a left tibial plateau fracture following a fall.  She underwent closed reduction of the fracture.  Course complicated by acute kidney injury, resolved.  She did experience chest pain during hospitalization which was felt to be chest wall in etiology.  CT scan of the chest revealed no PE.  She was discharged on Lovenox bridging with warfarin.  Hemoglobin 8.2 on admission, hemoglobin 9.2 on 5/20/2024 with MCV of 79  Iron studies consistent with iron deficiency.    Patient's left leg currently is in a cast.  She is nonweightbearing.  Pain has been under fair control.  She is having regular bowel movements.  Denies cough, chest pain, shortness of breath.    CODE STATUS/ADVANCE DIRECTIVES DISCUSSION:  Prior  CPR/Full code   ALLERGIES:   Allergies   Allergen Reactions     Ofloxacin      light headedness      PAST MEDICAL HISTORY: As noted above  PAST SURGICAL HISTORY:   has no past surgical history on file.  FAMILY HISTORY: family history is not on file.  SOCIAL HISTORY:     Patient's living condition: lives alone      Current Outpatient Medications   Medication Sig Dispense Refill     acetaminophen (TYLENOL) 325 MG tablet Take 325-650 mg  by mouth every 6 hours as needed for mild pain       amLODIPine (NORVASC) 10 MG tablet Take 10 mg by mouth daily       calcium carbonate (OS-VINCE) 500 MG TABS Take 1 tablet by mouth daily       escitalopram (LEXAPRO) 20 MG tablet Take 20 mg by mouth daily       melatonin 3 MG tablet Take 3 mg by mouth nightly as needed for sleep       multivitamin w/minerals (THERA-VIT-M) tablet Take 1 tablet by mouth daily       nystatin (MYCOSTATIN) 265219 UNIT/GM external powder Apply topically daily       ondansetron (ZOFRAN) 4 MG tablet Take 4 mg by mouth every 6 hours as needed for nausea       senna (SENOKOT) 8.6 MG tablet Take 1 tablet by mouth 2 times daily as needed for constipation       simvastatin (ZOCOR) 20 MG tablet Take 20 mg by mouth At Bedtime       Vitamin D3 (CHOLECALCIFEROL) 25 mcg (1000 units) tablet Take 1 tablet by mouth daily       warfarin ANTICOAGULANT (COUMADIN) 5 MG tablet Take 5 mg (5 mg x 1 TABLET) BY MOUTH every Sun, Tue, Thu; AND TAKE 7.5 mg (5 mg x 1.5 TABLETS) all other days OR as directed by INR clinic.       No current facility-administered medications for this visit.       ROS:  10 point ROS of systems including Constitutional, Eyes, Respiratory, Cardiovascular, Gastroenterology, Genitourinary, Integumentary, Musculoskeletal, Psychiatric were all negative except for pertinent positives noted in my HPI.    Vitals:  /60   Pulse 86   Temp 97.5  F (36.4  C)   Resp 18   Ht 1.524 m (5')   Wt 61 kg (134 lb 6.4 oz)   SpO2 95%   BMI 26.25 kg/m    Exam:  Very pleasant, well-appearing female, lying comfortably in bed.  HEENT: Oral mucosa moist  Lungs clear  CV regular rhythm  Abdomen soft, nontender, nondistended  Extremities, left leg and cast.  No right lower extremity edema  Neuro: Fully oriented.  Cranial nerves intact    Lab/Diagnostic data:  Most Recent 3 CBC's:  Recent Labs   Lab Test 05/20/24  0720 05/16/24  0710 10/31/23  0749 05/24/23  1737   WBC 6.6 7.4  --  8.8   HGB 9.2* 8.9* 8.7*  12.4   MCV 79 79  --  84   * 592*  --  415     Most Recent 3 BMP's:  Recent Labs   Lab Test 05/20/24  0720 05/24/23  1737 01/17/22  0705    134* 139   POTASSIUM 3.9 4.0 3.8   CHLORIDE 103 100 108   CO2 25 22 25   BUN 9.2 12.4 12   CR 0.59 0.73 0.70   ANIONGAP 12 12 6   VINCE 8.9 9.3 8.5   GLC 88 153* 91     Most Recent 2 LFT's:  Recent Labs   Lab Test 05/24/23  1737   AST 23   ALT 23   ALKPHOS 103   BILITOTAL 0.2     Most Recent TSH and T4:  Recent Labs   Lab Test 12/15/21  0925   TSH 1.86     Most Recent Anemia Panel:  Recent Labs   Lab Test 05/20/24  0720   WBC 6.6   HGB 9.2*   HCT 30.6*   MCV 79   *   IRON 27*   IRONSAT 8*      SAROJ 132       ASSESSMENT/PLAN:      Left tibial plateau fracture following a fall  Pain controlled  Plan: Nonweightbearing left leg.  Orthopedics follow-up    History of pulmonary embolism x 2 on chronic warfarin  Plan continue resumed warfarin, monitor INR    Microcytic anemia, stable.  History of GI bleed 2023.  No history of recent bleeding  Plan: Consider oral iron    Hypertension  Stable on prior to admission amlodipine  Plan: Monitor blood pressure, avoid hypotension    History of neurocognitive disorder  Meningioma  Mental status appears intact this morning  Plan: Continue prior to mission Lexapro, monitor mental and functional status      Jai Harris MD      Sincerely,        Jai Harris MD

## 2024-06-03 ENCOUNTER — TRANSITIONAL CARE UNIT VISIT (OUTPATIENT)
Dept: GERIATRICS | Facility: CLINIC | Age: 84
End: 2024-06-03
Payer: MEDICARE

## 2024-06-03 VITALS
SYSTOLIC BLOOD PRESSURE: 130 MMHG | BODY MASS INDEX: 26.39 KG/M2 | DIASTOLIC BLOOD PRESSURE: 70 MMHG | HEIGHT: 60 IN | RESPIRATION RATE: 18 BRPM | HEART RATE: 85 BPM | WEIGHT: 134.4 LBS | TEMPERATURE: 97.3 F | OXYGEN SATURATION: 93 %

## 2024-06-03 DIAGNOSIS — R53.81 PHYSICAL DECONDITIONING: ICD-10-CM

## 2024-06-03 DIAGNOSIS — Z86.711 HISTORY OF PULMONARY EMBOLISM: ICD-10-CM

## 2024-06-03 DIAGNOSIS — I10 ESSENTIAL HYPERTENSION: ICD-10-CM

## 2024-06-03 DIAGNOSIS — Z47.89 AFTERCARE FOLLOWING SURGERY OF THE MUSCULOSKELETAL SYSTEM: ICD-10-CM

## 2024-06-03 DIAGNOSIS — D64.9 ANEMIA, UNSPECIFIED TYPE: ICD-10-CM

## 2024-06-03 DIAGNOSIS — S82.142D CLOSED FRACTURE OF LEFT TIBIAL PLATEAU WITH ROUTINE HEALING, SUBSEQUENT ENCOUNTER: Primary | ICD-10-CM

## 2024-06-03 DIAGNOSIS — R41.9 NEUROCOGNITIVE DISORDER: ICD-10-CM

## 2024-06-03 PROCEDURE — 99309 SBSQ NF CARE MODERATE MDM 30: CPT | Performed by: NURSE PRACTITIONER

## 2024-06-03 NOTE — LETTER
6/3/2024        RE: Lawanda Crawford  [address] [address] [address]        [facility] GERIATRICS    Chief Complaint   Patient presents with     RECHECK     HPI:  Lawanda Crawford is a 83 year old  (1940), who is being seen today for an episodic care visit at: [facility] ([facility]) [[id number]]. Today's concern is:     Lawanda was visited today while in her room resting in bed.  She reports that her pain has been very minimal.  Therapy has been going well.  She went out for her Ortho appointment last week and states that it went very well.  She got a new cast placed.  She has not noted any signs or symptoms of bleeding.  Having regular bowel movements no complaints with urination.  No complaints of chest pain or shortness of breath.    Allergies, and PMH/PSH reviewed in EPIC today.  REVIEW OF SYSTEMS:  4 point ROS including Respiratory, CV, GI and , other than that noted in the HPI,  is negative    Objective:   /70   Pulse 85   Temp 97.3  F (36.3  C)   Resp 18   Ht 1.524 m (5')   Wt 61 kg (134 lb 6.4 oz)   SpO2 93%   BMI 26.25 kg/m    GENERAL APPEARANCE:  Alert, in no distress, pleasant, cooperative  EYES: no discharge or mattering on lids or lashes noted  ENT:  moist mucous membranes, hearing acuity intact  NECK: supple, symmetrical  RESP: no respiratory distress, Lung sounds clear, patient is on room air  CV:  rate and rhythm regular, no murmur. Edema none in bilateral lower extremities. VASCULAR: warm extremities without open areas.  ABDOMEN: normal bowel sounds, soft, nontender.  M/S:   Gait and station: unsafe without assistance, full leg cast on LLE  SKIN:  Inspection and palpation of skin and subcutaneous tissue: skin warm, dry and intact without rashes  NEURO: no facial asymmetry, no speech deficits and able to follow directions, moves all extremities symmetrically  PSYCH:  insight and judgement intact, memory mild impairment? affect and mood  normal    CBC RESULTS:   Recent Labs   Lab Test 05/20/24  0720 05/16/24  0710   WBC 6.6 7.4   RBC 3.88 3.76*   HGB 9.2* 8.9*   HCT 30.6* 29.6*   MCV 79 79   MCH 23.7* 23.7*   MCHC 30.1* 30.1*   RDW 23.4* 22.5*   * 592*       Last Basic Metabolic Panel:  Recent Labs   Lab Test 05/20/24  0720 05/24/23  1737    134*   POTASSIUM 3.9 4.0   CHLORIDE 103 100   VINCE 8.9 9.3   CO2 25 22   BUN 9.2 12.4   CR 0.59 0.73   GLC 88 153*       Liver Function Studies -   Recent Labs   Lab Test 05/24/23  1737   PROTTOTAL 7.3   ALBUMIN 4.0   BILITOTAL 0.2   ALKPHOS 103   AST 23   ALT 23       TSH   Date Value Ref Range Status   12/15/2021 1.86 0.40 - 4.00 mU/L Final     Assessment/Plan:  Left tibial plateau fracture   Sustained from a fall. s/p closed reduction and casting (5/10). Pain well controlled today.  She followed up with Ortho last week and had a new cast placed.  -Ongoing NWB LLE  -Continue PRN Tylenol 650 mg q4hr prn for pain control  - PT/OT  - F/up with ortho as scheduled later this month.    History of PE  Patient with history of two PEs. On Coumadin. Bridged with Lovenox. INR 2.4 today,  - Coumadin 4 mg daily and recheck INR on Friday. (Home dose of coumadin is 5 mg alternating with 7.5 mg).     Microcytic anemia  Hgb 8.2 on admission. Baseline ~8. Hx of GIB in Fall 2023. HGB here at the TCU is 9.2.  Iron 27 and saturation index of 8 on 5/20/2024 therefore iron supplement was started.  - recheck HGB periodically throughout the stay.   - Started on ferrous sulfate here at the TCU    Hypertension  Amlodipine was decreased slightly last week as her blood pressures were in the low 100s but this has improved to -130s.    - Continue Amlodipine 7.5 mg  - Continue to monitor blood pressure and heart rate, adjust medications as needed.    Hx of neurocognitive disorder  Meningioma  Appears to have mild impairment. Currently lives in a town home independently. Calm and pleasant. SLUMS 13/30, CPT 4.6/5.6.  She  currently lives at home independently so the scores indicate that she will need more services after discharge.  -Continue with Lexapro   -Daughter is planning for her to return back to her previously independent home.  She is looking into assisted living facilities    Physical deconditioning  Secondary to recent hospitalization and underlying medical conditions.   --ongoing PT/OT for strengthening.     MED REC REQUIRED  Post Medication Reconciliation Status: medication reconcilation previously completed during another office visit      Electronically signed by: CHRISTINE Carrasco CNP                     Sincerely,        CHRISTINE Carrasco CNP       Opt out

## 2024-06-03 NOTE — PROGRESS NOTES
[facility] GERIATRICS    Chief Complaint   Patient presents with    RECHECK     HPI:  Lawanda Crawford is a 83 year old  (1940), who is being seen today for an episodic care visit at: [facility] ([facility]) [[id number]]. Today's concern is:     Lawanda was visited today while in her room resting in bed.  She reports that her pain has been very minimal.  Therapy has been going well.  She went out for her Ortho appointment last week and states that it went very well.  She got a new cast placed.  She has not noted any signs or symptoms of bleeding.  Having regular bowel movements no complaints with urination.  No complaints of chest pain or shortness of breath.    Allergies, and PMH/PSH reviewed in EPIC today.  REVIEW OF SYSTEMS:  4 point ROS including Respiratory, CV, GI and , other than that noted in the HPI,  is negative    Objective:   /70   Pulse 85   Temp 97.3  F (36.3  C)   Resp 18   Ht 1.524 m (5')   Wt 61 kg (134 lb 6.4 oz)   SpO2 93%   BMI 26.25 kg/m    GENERAL APPEARANCE:  Alert, in no distress, pleasant, cooperative  EYES: no discharge or mattering on lids or lashes noted  ENT:  moist mucous membranes, hearing acuity intact  NECK: supple, symmetrical  RESP: no respiratory distress, Lung sounds clear, patient is on room air  CV:  rate and rhythm regular, no murmur. Edema none in bilateral lower extremities. VASCULAR: warm extremities without open areas.  ABDOMEN: normal bowel sounds, soft, nontender.  M/S:   Gait and station: unsafe without assistance, full leg cast on LLE  SKIN:  Inspection and palpation of skin and subcutaneous tissue: skin warm, dry and intact without rashes  NEURO: no facial asymmetry, no speech deficits and able to follow directions, moves all extremities symmetrically  PSYCH:  insight and judgement intact, memory mild impairment? affect and mood normal    CBC RESULTS:   Recent Labs   Lab Test 05/20/24  0720 05/16/24  0710   WBC 6.6 7.4   RBC 3.88  3.76*   HGB 9.2* 8.9*   HCT 30.6* 29.6*   MCV 79 79   MCH 23.7* 23.7*   MCHC 30.1* 30.1*   RDW 23.4* 22.5*   * 592*       Last Basic Metabolic Panel:  Recent Labs   Lab Test 05/20/24  0720 05/24/23  1737    134*   POTASSIUM 3.9 4.0   CHLORIDE 103 100   VINCE 8.9 9.3   CO2 25 22   BUN 9.2 12.4   CR 0.59 0.73   GLC 88 153*       Liver Function Studies -   Recent Labs   Lab Test 05/24/23  1737   PROTTOTAL 7.3   ALBUMIN 4.0   BILITOTAL 0.2   ALKPHOS 103   AST 23   ALT 23       TSH   Date Value Ref Range Status   12/15/2021 1.86 0.40 - 4.00 mU/L Final     Assessment/Plan:  Left tibial plateau fracture   Sustained from a fall. s/p closed reduction and casting (5/10). Pain well controlled today.  She followed up with Ortho last week and had a new cast placed.  -Ongoing NWB LLE  -Continue PRN Tylenol 650 mg q4hr prn for pain control  - PT/OT  - F/up with ortho as scheduled later this month.    History of PE  Patient with history of two PEs. On Coumadin. Bridged with Lovenox. INR 2.4 today,  - Coumadin 4 mg daily and recheck INR on Friday. (Home dose of coumadin is 5 mg alternating with 7.5 mg).     Microcytic anemia  Hgb 8.2 on admission. Baseline ~8. Hx of GIB in Fall 2023. HGB here at the TCU is 9.2.  Iron 27 and saturation index of 8 on 5/20/2024 therefore iron supplement was started.  - recheck HGB periodically throughout the stay.   - Started on ferrous sulfate here at the TCU    Hypertension  Amlodipine was decreased slightly last week as her blood pressures were in the low 100s but this has improved to -130s.    - Continue Amlodipine 7.5 mg  - Continue to monitor blood pressure and heart rate, adjust medications as needed.    Hx of neurocognitive disorder  Meningioma  Appears to have mild impairment. Currently lives in a town home independently. Calm and pleasant. SLUMS 13/30, CPT 4.6/5.6.  She currently lives at home independently so the scores indicate that she will need more services after  discharge.  -Continue with Lexapro   -Daughter is planning for her to return back to her previously independent home.  She is looking into assisted living facilities    Physical deconditioning  Secondary to recent hospitalization and underlying medical conditions.   --ongoing PT/OT for strengthening.     MED REC REQUIRED  Post Medication Reconciliation Status: medication reconcilation previously completed during another office visit      Electronically signed by: CHRISTINE Carrasco CNP

## 2024-06-06 ENCOUNTER — LAB REQUISITION (OUTPATIENT)
Dept: LAB | Facility: CLINIC | Age: 84
End: 2024-06-06
Payer: MEDICARE

## 2024-06-06 DIAGNOSIS — I48.20 CHRONIC ATRIAL FIBRILLATION, UNSPECIFIED (H): ICD-10-CM

## 2024-06-07 ENCOUNTER — TELEPHONE (OUTPATIENT)
Dept: GERIATRICS | Facility: CLINIC | Age: 84
End: 2024-06-07

## 2024-06-07 LAB — INR PPP: 1.95 (ref 0.85–1.15)

## 2024-06-07 PROCEDURE — 85610 PROTHROMBIN TIME: CPT | Mod: ORL | Performed by: NURSE PRACTITIONER

## 2024-06-07 PROCEDURE — P9604 ONE-WAY ALLOW PRORATED TRIP: HCPCS | Mod: ORL | Performed by: NURSE PRACTITIONER

## 2024-06-07 PROCEDURE — 36415 COLL VENOUS BLD VENIPUNCTURE: CPT | Performed by: NURSE PRACTITIONER

## 2024-06-07 NOTE — TELEPHONE ENCOUNTER
[facility] Geriatrics Triage Nurse INR     Provider: CHRISTINE Dominguez CNP   Facility: [facility]   Facility Type:  TCU    Caller: Carlie  Call Back Number:   Reason for call: INR  Diagnosis/Goal: PE    Date INR New Dose/Orders   5/28 3.3 2 mg on 5/28, 5 mg on 5/29 5/30 3.0 4 mg on 5/30, 5/31 and 5 mg on 6/1, 6/2   6/3 2.4 4 mg daily   6/7 1.95 5 mg daily        Heparin/Lovenox:  No  Currently on ABX?: No  Other interacting medication:  None  Missed or refused doses: No    Verbal Order/Direction given by Provider: Coumadin 5 mg po daily and recheck INR on 6/10.    Provider Giving Order:  Jai Harris MD    Verbal Order given to: Carlie Lees RN

## 2024-06-10 ENCOUNTER — TRANSITIONAL CARE UNIT VISIT (OUTPATIENT)
Dept: GERIATRICS | Facility: CLINIC | Age: 84
End: 2024-06-10
Payer: MEDICARE

## 2024-06-10 VITALS
OXYGEN SATURATION: 93 % | RESPIRATION RATE: 18 BRPM | DIASTOLIC BLOOD PRESSURE: 68 MMHG | TEMPERATURE: 97.3 F | WEIGHT: 142.8 LBS | HEART RATE: 91 BPM | SYSTOLIC BLOOD PRESSURE: 119 MMHG | BODY MASS INDEX: 27.89 KG/M2

## 2024-06-10 DIAGNOSIS — Z86.711 HISTORY OF PULMONARY EMBOLISM: ICD-10-CM

## 2024-06-10 DIAGNOSIS — R41.9 NEUROCOGNITIVE DISORDER: ICD-10-CM

## 2024-06-10 DIAGNOSIS — D64.9 ANEMIA, UNSPECIFIED TYPE: ICD-10-CM

## 2024-06-10 DIAGNOSIS — S82.142D CLOSED FRACTURE OF LEFT TIBIAL PLATEAU WITH ROUTINE HEALING, SUBSEQUENT ENCOUNTER: Primary | ICD-10-CM

## 2024-06-10 DIAGNOSIS — I10 ESSENTIAL HYPERTENSION: ICD-10-CM

## 2024-06-10 DIAGNOSIS — R53.81 PHYSICAL DECONDITIONING: ICD-10-CM

## 2024-06-10 PROCEDURE — 99309 SBSQ NF CARE MODERATE MDM 30: CPT | Performed by: NURSE PRACTITIONER

## 2024-06-10 NOTE — LETTER
6/10/2024      Lawanda Crawford  [address] [place] [address]        [facility] GERIATRICS    Chief Complaint   Patient presents with     RECHECK     HPI:  Lawanda Crawford is a 83 year old  (1940), who is being seen today for an episodic care visit at: [facility] ([facility]) [[id number]]. Today's concern is:     Lawanda was visited today while sitting up in the recliner. She reports that her pain has been very minimal.  Therapy has been going well. She has not noted any signs or symptoms of bleeding.  Having regular bowel movements no complaints with urination.  No complaints of chest pain or shortness of breath.    Allergies, and PMH/PSH reviewed in [facility] today.  REVIEW OF SYSTEMS:  4 point ROS including Respiratory, CV, GI and , other than that noted in the HPI,  is negative    Objective:   /68   Pulse 91   Temp 97.3  F (36.3  C)   Resp 18   Wt 64.8 kg (142 lb 12.8 oz)   SpO2 93%   BMI 27.89 kg/m    GENERAL APPEARANCE:  Alert, in no distress, pleasant, cooperative  EYES: no discharge or mattering on lids or lashes noted  ENT:  moist mucous membranes, hearing acuity intact  NECK: supple, symmetrical  RESP: no respiratory distress, Lung sounds clear, patient is on room air  CV:  rate and rhythm regular, no murmur. Edema none in bilateral lower extremities. VASCULAR: warm extremities without open areas.  ABDOMEN: normal bowel sounds, soft, nontender.  M/S:   Gait and station: unsafe without assistance, full leg cast on LLE  SKIN:  Inspection and palpation of skin and subcutaneous tissue: skin warm, dry and intact without rashes  NEURO: no facial asymmetry, no speech deficits and able to follow directions, moves all extremities symmetrically  PSYCH:  insight and judgement intact, memory mild impairment? affect and mood normal    CBC RESULTS:   Recent Labs   Lab Test 05/20/24  0720 05/16/24  0710   WBC 6.6 7.4   RBC 3.88 3.76*   HGB 9.2* 8.9*   HCT 30.6* 29.6*   MCV  79 79   MCH 23.7* 23.7*   MCHC 30.1* 30.1*   RDW 23.4* 22.5*   * 592*       Last Basic Metabolic Panel:  Recent Labs   Lab Test 05/20/24  0720 05/24/23  1737    134*   POTASSIUM 3.9 4.0   CHLORIDE 103 100   VINCE 8.9 9.3   CO2 25 22   BUN 9.2 12.4   CR 0.59 0.73   GLC 88 153*       Liver Function Studies -   Recent Labs   Lab Test 05/24/23  1737   PROTTOTAL 7.3   ALBUMIN 4.0   BILITOTAL 0.2   ALKPHOS 103   AST 23   ALT 23       TSH   Date Value Ref Range Status   12/15/2021 1.86 0.40 - 4.00 mU/L Final     Assessment/Plan:  Left tibial plateau fracture   Sustained from a fall. s/p closed reduction and casting (5/10). Pain well controlled today.  She followed up with Ortho two weeks ago with new cast placement.   -Ongoing NWB LLE  -Continue PRN Tylenol 650 mg q4hr prn for pain control  - PT/OT  - F/up with ortho as scheduled later this month.    History of PE  Patient with history of two PEs. On Coumadin. Bridged with Lovenox. INR 2.4 today  - Coumadin 5 mg T/TH and 4 mg M/W and recheck INR on Friday. (Home dose of coumadin is 5 mg alternating with 7.5 mg).     Microcytic anemia  Hgb 8.2 on admission. Baseline ~8. Hx of GIB in Fall 2023. HGB here at the TCU is 9.2.  Iron 27 and saturation index of 8 on 5/20/2024 therefore iron supplement was started.  - recheck HGB periodically throughout the stay.   - Started on ferrous sulfate here at the TCU    Hypertension  Amlodipine was decreased slightly two weeks ago as her blood pressures were in the low 100s and continues to be low 100s so will decrease further.    - Decrease Amlodipine  to 5 mg  - Continue to monitor blood pressure and heart rate, adjust medications as needed.    Hx of neurocognitive disorder  Meningioma  Appears to have mild impairment. Currently lives in a town home independently. Calm and pleasant. SLUMS 13/30, CPT 4.6/5.6.  She currently lives at home independently so the scores indicate that she will need more services after  discharge.  -Continue with Lexapro   -Daughter is planning for her to return back to her previously independent home.  She is looking into assisted living facilities    Physical deconditioning  Secondary to recent hospitalization and underlying medical conditions.   --ongoing PT/OT for strengthening.     MED REC REQUIRED  Post Medication Reconciliation Status: medication reconcilation previously completed during another office visit      Electronically signed by: CHRISTINE Carrasco CNP                       Sincerely,        CHRISTINE Carrasco CNP

## 2024-06-10 NOTE — PROGRESS NOTES
[facility] GERIATRICS    Chief Complaint   Patient presents with    RECHECK     HPI:  Lawanda Crawford is a 83 year old  (1940), who is being seen today for an episodic care visit at: [facility] ([facility]) [[id number]]. Today's concern is:     Lawanda was visited today while sitting up in the recliner. She reports that her pain has been very minimal.  Therapy has been going well. She has not noted any signs or symptoms of bleeding.  Having regular bowel movements no complaints with urination.  No complaints of chest pain or shortness of breath.    Allergies, and PMH/PSH reviewed in EPIC today.  REVIEW OF SYSTEMS:  4 point ROS including Respiratory, CV, GI and , other than that noted in the HPI,  is negative    Objective:   /68   Pulse 91   Temp 97.3  F (36.3  C)   Resp 18   Wt 64.8 kg (142 lb 12.8 oz)   SpO2 93%   BMI 27.89 kg/m    GENERAL APPEARANCE:  Alert, in no distress, pleasant, cooperative  EYES: no discharge or mattering on lids or lashes noted  ENT:  moist mucous membranes, hearing acuity intact  NECK: supple, symmetrical  RESP: no respiratory distress, Lung sounds clear, patient is on room air  CV:  rate and rhythm regular, no murmur. Edema none in bilateral lower extremities. VASCULAR: warm extremities without open areas.  ABDOMEN: normal bowel sounds, soft, nontender.  M/S:   Gait and station: unsafe without assistance, full leg cast on LLE  SKIN:  Inspection and palpation of skin and subcutaneous tissue: skin warm, dry and intact without rashes  NEURO: no facial asymmetry, no speech deficits and able to follow directions, moves all extremities symmetrically  PSYCH:  insight and judgement intact, memory mild impairment? affect and mood normal    CBC RESULTS:   Recent Labs   Lab Test 05/20/24  0720 05/16/24  0710   WBC 6.6 7.4   RBC 3.88 3.76*   HGB 9.2* 8.9*   HCT 30.6* 29.6*   MCV 79 79   MCH 23.7* 23.7*   MCHC 30.1* 30.1*   RDW 23.4* 22.5*   * 592*       Last  Basic Metabolic Panel:  Recent Labs   Lab Test 05/20/24  0720 05/24/23  1737    134*   POTASSIUM 3.9 4.0   CHLORIDE 103 100   VINCE 8.9 9.3   CO2 25 22   BUN 9.2 12.4   CR 0.59 0.73   GLC 88 153*       Liver Function Studies -   Recent Labs   Lab Test 05/24/23  1737   PROTTOTAL 7.3   ALBUMIN 4.0   BILITOTAL 0.2   ALKPHOS 103   AST 23   ALT 23       TSH   Date Value Ref Range Status   12/15/2021 1.86 0.40 - 4.00 mU/L Final     Assessment/Plan:  Left tibial plateau fracture   Sustained from a fall. s/p closed reduction and casting (5/10). Pain well controlled today.  She followed up with Ortho two weeks ago with new cast placement.   -Ongoing NWB LLE  -Continue PRN Tylenol 650 mg q4hr prn for pain control  - PT/OT  - F/up with ortho as scheduled later this month.    History of PE  Patient with history of two PEs. On Coumadin. Bridged with Lovenox. INR 2.4 today  - Coumadin 5 mg T/TH and 4 mg M/W and recheck INR on Friday. (Home dose of coumadin is 5 mg alternating with 7.5 mg).     Microcytic anemia  Hgb 8.2 on admission. Baseline ~8. Hx of GIB in Fall 2023. HGB here at the TCU is 9.2.  Iron 27 and saturation index of 8 on 5/20/2024 therefore iron supplement was started.  - recheck HGB periodically throughout the stay.   - Started on ferrous sulfate here at the TCU    Hypertension  Amlodipine was decreased slightly two weeks ago as her blood pressures were in the low 100s and continues to be low 100s so will decrease further.    - Decrease Amlodipine  to 5 mg  - Continue to monitor blood pressure and heart rate, adjust medications as needed.    Hx of neurocognitive disorder  Meningioma  Appears to have mild impairment. Currently lives in a town home independently. Calm and pleasant. SLUMS 13/30, CPT 4.6/5.6.  She currently lives at home independently so the scores indicate that she will need more services after discharge.  -Continue with Lexapro   -Daughter is planning for her to return back to her previously  independent home.  She is looking into assisted living facilities    Physical deconditioning  Secondary to recent hospitalization and underlying medical conditions.   --ongoing PT/OT for strengthening.     MED REC REQUIRED  Post Medication Reconciliation Status: medication reconcilation previously completed during another office visit      Electronically signed by: CHRISTINE Carrasco CNP

## 2024-06-14 ENCOUNTER — TRANSITIONAL CARE UNIT VISIT (OUTPATIENT)
Dept: GERIATRICS | Facility: CLINIC | Age: 84
End: 2024-06-14
Payer: MEDICARE

## 2024-06-14 VITALS
WEIGHT: 142.8 LBS | HEART RATE: 84 BPM | TEMPERATURE: 97.3 F | BODY MASS INDEX: 27.89 KG/M2 | SYSTOLIC BLOOD PRESSURE: 133 MMHG | RESPIRATION RATE: 18 BRPM | DIASTOLIC BLOOD PRESSURE: 70 MMHG | OXYGEN SATURATION: 93 %

## 2024-06-14 DIAGNOSIS — Z86.711 HISTORY OF PULMONARY EMBOLISM: Primary | ICD-10-CM

## 2024-06-14 DIAGNOSIS — Z79.01 ENCOUNTER FOR CURRENT LONG-TERM USE OF ANTICOAGULANTS: ICD-10-CM

## 2024-06-14 DIAGNOSIS — Z51.81 ENCOUNTER FOR THERAPEUTIC DRUG MONITORING: ICD-10-CM

## 2024-06-14 PROCEDURE — 99308 SBSQ NF CARE LOW MDM 20: CPT | Performed by: NURSE PRACTITIONER

## 2024-06-14 NOTE — PROGRESS NOTES
[facility] GERIATRICS  [facility] Medical Record Number:  [id number]  Place of Service where encounter took place: [facility] ([facility]) [[id number]]    HPI:    Lawanda Crawford is a 83 year old  (1940), who is being seen today for an episodic care visit at the above location. Today's concern is INR/Coumadin management for A. Fib    ROS/Subjective:  Bleeding Signs/Symptoms:  None  Thromboembolic Signs/Symptoms:  None  Medication Changes:  No  Dietary Changes:  No  Activity Changes: No  Bacterial/Viral Infection:  No  Missed Coumadin Doses:  None  On ASA: No  Other Concerns:  No    OBJECTIVE:  /70   Pulse 84   Temp 97.3  F (36.3  C)   Resp 18   Wt 64.8 kg (142 lb 12.8 oz)   SpO2 93%   BMI 27.89 kg/m    Last INR: 1.95 on 6/7/24  INR Today:    Current Dose:  5 mg daily      ASSESSMENT:     History of pulmonary embolism  Encounter for therapeutic drug monitoring  Encounter for current long-term use of anticoagulants  Therapeutic INR for goal of 2-3    PLAN/ORDERS:   New Dose: 4 mg on Thursday/Saturday and 5 mg AOD    Next INR: 6/21/24    Electronically signed by:  CHRISTINE Carrasco CNP

## 2024-06-14 NOTE — LETTER
6/14/2024      Lawanda Crawford  [address]  [place] [address]        [facility] Record Number:  [id number]  Place of Service where encounter took place: [facility] ([facility]) [[id number]]    HPI:    Lawanda Crawford is a 83 year old  (1940), who is being seen today for an episodic care visit at the above location. Today's concern is INR/Coumadin management for A. Fib    ROS/Subjective:  Bleeding Signs/Symptoms:  None  Thromboembolic Signs/Symptoms:  None  Medication Changes:  No  Dietary Changes:  No  Activity Changes: No  Bacterial/Viral Infection:  No  Missed Coumadin Doses:  None  On ASA: No  Other Concerns:  No    OBJECTIVE:  /70   Pulse 84   Temp 97.3  F (36.3  C)   Resp 18   Wt 64.8 kg (142 lb 12.8 oz)   SpO2 93%   BMI 27.89 kg/m    Last INR: 1.95 on 6/7/24  INR Today:    Current Dose:  5 mg daily      ASSESSMENT:     History of pulmonary embolism  Encounter for therapeutic drug monitoring  Encounter for current long-term use of anticoagulants  Therapeutic INR for goal of 2-3    PLAN/ORDERS:   New Dose: 4 mg on Thursday/Saturday and 5 mg AOD    Next INR: 6/21/24    Electronically signed by:  CHRISTINE Carrasco CNP       Sincerely,        CHRISTINE Carrasco CNP

## 2024-06-19 ENCOUNTER — TRANSITIONAL CARE UNIT VISIT (OUTPATIENT)
Dept: GERIATRICS | Facility: CLINIC | Age: 84
End: 2024-06-19
Payer: MEDICARE

## 2024-06-19 VITALS
BODY MASS INDEX: 28.02 KG/M2 | OXYGEN SATURATION: 96 % | RESPIRATION RATE: 18 BRPM | HEART RATE: 76 BPM | TEMPERATURE: 97.1 F | HEIGHT: 60 IN | SYSTOLIC BLOOD PRESSURE: 127 MMHG | WEIGHT: 142.7 LBS | DIASTOLIC BLOOD PRESSURE: 69 MMHG

## 2024-06-19 DIAGNOSIS — R53.81 PHYSICAL DECONDITIONING: ICD-10-CM

## 2024-06-19 DIAGNOSIS — I10 ESSENTIAL HYPERTENSION: ICD-10-CM

## 2024-06-19 DIAGNOSIS — D64.9 ANEMIA, UNSPECIFIED TYPE: ICD-10-CM

## 2024-06-19 DIAGNOSIS — R41.9 NEUROCOGNITIVE DISORDER: ICD-10-CM

## 2024-06-19 DIAGNOSIS — S82.142D CLOSED FRACTURE OF LEFT TIBIAL PLATEAU WITH ROUTINE HEALING, SUBSEQUENT ENCOUNTER: Primary | ICD-10-CM

## 2024-06-19 PROCEDURE — 99309 SBSQ NF CARE MODERATE MDM 30: CPT | Performed by: NURSE PRACTITIONER

## 2024-06-19 NOTE — PROGRESS NOTES
[facility] GERIATRICS    Chief Complaint   Patient presents with    RECHECK     HPI:  Lawanda Crawford is a 83 year old  (1940), who is being seen today for an episodic care visit at: [facility] ([facility]) [[id number]]. Today's concern is:     Lawanda was visited today while in her bed resting. She reports that her pain has been very minimal and that therapy has been going well. She has not noted any signs or symptoms of bleeding.  Having regular bowel movements no complaints with urination.  No complaints of chest pain or shortness of breath.     She continues to work with therapy however given her weightbearing restrictions she is plateauing.  She will likely have a last covered day selected in the next week or 2.  Probably going to be staying with her daughter what they are looking for an AL facility.    Allergies, and PMH/PSH reviewed in EPIC today.  REVIEW OF SYSTEMS:  4 point ROS including Respiratory, CV, GI and , other than that noted in the HPI,  is negative    Objective:   /69   Pulse 76   Temp 97.1  F (36.2  C)   Resp 18   Ht 1.524 m (5')   Wt 64.7 kg (142 lb 11.2 oz)   SpO2 96%   BMI 27.87 kg/m    GENERAL APPEARANCE:  Alert, in no distress, pleasant, cooperative  EYES: no discharge or mattering on lids or lashes noted  ENT:  moist mucous membranes, hearing acuity intact  NECK: supple, symmetrical  RESP: no respiratory distress, Lung sounds clear, patient is on room air  CV:  rate and rhythm regular, no murmur. Edema none in bilateral lower extremities. VASCULAR: warm extremities without open areas.  ABDOMEN: normal bowel sounds, soft, nontender.  M/S:   Gait and station: unsafe without assistance, full leg cast on LLE  SKIN:  Inspection and palpation of skin and subcutaneous tissue: skin warm, dry and intact without rashes  NEURO: no facial asymmetry, no speech deficits and able to follow directions, moves all extremities symmetrically  PSYCH:  insight and judgement  intact, memory mild impairment? affect and mood normal    CBC RESULTS:   Recent Labs   Lab Test 05/20/24  0720 05/16/24  0710   WBC 6.6 7.4   RBC 3.88 3.76*   HGB 9.2* 8.9*   HCT 30.6* 29.6*   MCV 79 79   MCH 23.7* 23.7*   MCHC 30.1* 30.1*   RDW 23.4* 22.5*   * 592*       Last Basic Metabolic Panel:  Recent Labs   Lab Test 05/20/24  0720 05/24/23  1737    134*   POTASSIUM 3.9 4.0   CHLORIDE 103 100   VINCE 8.9 9.3   CO2 25 22   BUN 9.2 12.4   CR 0.59 0.73   GLC 88 153*       Liver Function Studies -   Recent Labs   Lab Test 05/24/23  1737   PROTTOTAL 7.3   ALBUMIN 4.0   BILITOTAL 0.2   ALKPHOS 103   AST 23   ALT 23       TSH   Date Value Ref Range Status   12/15/2021 1.86 0.40 - 4.00 mU/L Final     Assessment/Plan:  Left tibial plateau fracture   Sustained from a fall. s/p closed reduction and casting (5/10). Pain well controlled today.  She has since followed up with ortho for initial visit and cast was replaced.   -Ongoing NWB LLE  -Continue PRN Tylenol 650 mg q4hr prn for pain control  - PT/OT  - F/up with ortho as scheduled later this month.    History of PE  Patient with history of two PEs. On Coumadin. Bridged with Lovenox. INR  has been stable.   - Continue with coumadin and recheck INR on 6/21. (Home dose of coumadin is 5 mg alternating with 7.5 mg).     Microcytic anemia  Hgb 8.2 on admission. Baseline ~8. Hx of GIB in Fall 2023. HGB here at the TCU is 9.2.  Iron 27 and saturation index of 8 on 5/20/2024 therefore iron supplement was started.  - recheck HGB periodically throughout the stay.   - Started on ferrous sulfate here at the TCU    Hypertension  Amlodipine was decreased  earlier on in the stay and pressures still soft in the low 100s therefore decreased again down to 5 mg and now better controlled in the 120s.   - Continue Amlodipine  to 5 mg  - Continue to monitor blood pressure and heart rate, adjust medications as needed.    Hx of neurocognitive disorder  Meningioma  Appears to have  mild impairment. Currently lives in a town home independently. Calm and pleasant. SLUMS 13/30, CPT 4.6/5.6.  She currently lives at home independently so the scores indicate that she will need more services after discharge.  -Continue with Lexapro   -Daughter is planning for her to return back to her previously independent home.  She is looking into assisted living facilities    Physical deconditioning  Secondary to recent hospitalization and underlying medical conditions.   --ongoing PT/OT for strengthening.     MED REC REQUIRED  Post Medication Reconciliation Status: medication reconcilation previously completed during another office visit      Electronically signed by: CHRISTINE Carrasco CNP

## 2024-06-19 NOTE — LETTER
6/19/2024      Lawanda Crawford  [address] [address] [address]        [facility] GERIATRICS    Chief Complaint   Patient presents with     RECHECK     HPI:  Lawanda Crawford is a 83 year old  (1940), who is being seen today for an episodic care visit at: [facility] ([facility]) [[id number]]. Today's concern is:     Lawanda was visited today while in her bed resting. She reports that her pain has been very minimal and that therapy has been going well. She has not noted any signs or symptoms of bleeding.  Having regular bowel movements no complaints with urination.  No complaints of chest pain or shortness of breath.     She continues to work with therapy however given her weightbearing restrictions she is plateauing.  She will likely have a last covered day selected in the next week or 2.  Probably going to be staying with her daughter what they are looking for an AL facility.    Allergies, and PMH/PSH reviewed in EPIC today.  REVIEW OF SYSTEMS:  4 point ROS including Respiratory, CV, GI and , other than that noted in the HPI,  is negative    Objective:   /69   Pulse 76   Temp 97.1  F (36.2  C)   Resp 18   Ht 1.524 m (5')   Wt 64.7 kg (142 lb 11.2 oz)   SpO2 96%   BMI 27.87 kg/m    GENERAL APPEARANCE:  Alert, in no distress, pleasant, cooperative  EYES: no discharge or mattering on lids or lashes noted  ENT:  moist mucous membranes, hearing acuity intact  NECK: supple, symmetrical  RESP: no respiratory distress, Lung sounds clear, patient is on room air  CV:  rate and rhythm regular, no murmur. Edema none in bilateral lower extremities. VASCULAR: warm extremities without open areas.  ABDOMEN: normal bowel sounds, soft, nontender.  M/S:   Gait and station: unsafe without assistance, full leg cast on LLE  SKIN:  Inspection and palpation of skin and subcutaneous tissue: skin warm, dry and intact without rashes  NEURO: no facial asymmetry, no speech deficits and able to  follow directions, moves all extremities symmetrically  PSYCH:  insight and judgement intact, memory mild impairment? affect and mood normal    CBC RESULTS:   Recent Labs   Lab Test 05/20/24  0720 05/16/24  0710   WBC 6.6 7.4   RBC 3.88 3.76*   HGB 9.2* 8.9*   HCT 30.6* 29.6*   MCV 79 79   MCH 23.7* 23.7*   MCHC 30.1* 30.1*   RDW 23.4* 22.5*   * 592*       Last Basic Metabolic Panel:  Recent Labs   Lab Test 05/20/24  0720 05/24/23  1737    134*   POTASSIUM 3.9 4.0   CHLORIDE 103 100   VINCE 8.9 9.3   CO2 25 22   BUN 9.2 12.4   CR 0.59 0.73   GLC 88 153*       Liver Function Studies -   Recent Labs   Lab Test 05/24/23  1737   PROTTOTAL 7.3   ALBUMIN 4.0   BILITOTAL 0.2   ALKPHOS 103   AST 23   ALT 23       TSH   Date Value Ref Range Status   12/15/2021 1.86 0.40 - 4.00 mU/L Final     Assessment/Plan:  Left tibial plateau fracture   Sustained from a fall. s/p closed reduction and casting (5/10). Pain well controlled today.  She has since followed up with ortho for initial visit and cast was replaced.   -Ongoing NWB LLE  -Continue PRN Tylenol 650 mg q4hr prn for pain control  - PT/OT  - F/up with ortho as scheduled later this month.    History of PE  Patient with history of two PEs. On Coumadin. Bridged with Lovenox. INR  has been stable.   - Continue with coumadin and recheck INR on 6/21. (Home dose of coumadin is 5 mg alternating with 7.5 mg).     Microcytic anemia  Hgb 8.2 on admission. Baseline ~8. Hx of GIB in Fall 2023. HGB here at the TCU is 9.2.  Iron 27 and saturation index of 8 on 5/20/2024 therefore iron supplement was started.  - recheck HGB periodically throughout the stay.   - Started on ferrous sulfate here at the TCU    Hypertension  Amlodipine was decreased  earlier on in the stay and pressures still soft in the low 100s therefore decreased again down to 5 mg and now better controlled in the 120s.   - Continue Amlodipine  to 5 mg  - Continue to monitor blood pressure and heart rate, adjust  medications as needed.    Hx of neurocognitive disorder  Meningioma  Appears to have mild impairment. Currently lives in a town home independently. Calm and pleasant. SLUMS 13/30, CPT 4.6/5.6.  She currently lives at home independently so the scores indicate that she will need more services after discharge.  -Continue with Lexapro   -Daughter is planning for her to return back to her previously independent home.  She is looking into assisted living facilities    Physical deconditioning  Secondary to recent hospitalization and underlying medical conditions.   --ongoing PT/OT for strengthening.     MED REC REQUIRED  Post Medication Reconciliation Status: medication reconcilation previously completed during another office visit      Electronically signed by: CHRISTINE Carrasco CNP                           Sincerely,        CHRISTINE Carrasco CNP

## 2024-06-27 ENCOUNTER — TRANSITIONAL CARE UNIT VISIT (OUTPATIENT)
Dept: GERIATRICS | Facility: CLINIC | Age: 84
End: 2024-06-27
Payer: MEDICARE

## 2024-06-27 VITALS
RESPIRATION RATE: 18 BRPM | WEIGHT: 140.2 LBS | HEART RATE: 84 BPM | DIASTOLIC BLOOD PRESSURE: 68 MMHG | TEMPERATURE: 96.9 F | SYSTOLIC BLOOD PRESSURE: 129 MMHG | BODY MASS INDEX: 27.38 KG/M2 | OXYGEN SATURATION: 91 %

## 2024-06-27 DIAGNOSIS — R53.81 PHYSICAL DECONDITIONING: ICD-10-CM

## 2024-06-27 DIAGNOSIS — R41.9 NEUROCOGNITIVE DISORDER: ICD-10-CM

## 2024-06-27 DIAGNOSIS — D64.9 ANEMIA, UNSPECIFIED TYPE: ICD-10-CM

## 2024-06-27 DIAGNOSIS — S82.142D CLOSED FRACTURE OF LEFT TIBIAL PLATEAU WITH ROUTINE HEALING, SUBSEQUENT ENCOUNTER: Primary | ICD-10-CM

## 2024-06-27 DIAGNOSIS — I10 ESSENTIAL HYPERTENSION: ICD-10-CM

## 2024-06-27 PROCEDURE — 99309 SBSQ NF CARE MODERATE MDM 30: CPT | Performed by: NURSE PRACTITIONER

## 2024-06-27 NOTE — PROGRESS NOTES
[facility] GERIATRICS    Chief Complaint   Patient presents with    RECHECK     HPI:  Lawanda Crawford is a 83 year old  (1940), who is being seen today for an episodic care visit at: [facility] ([facility]) [[id number]]. Today's concern is:     Lawanda was visited today while in her room sitting in the recliner, working on word search. Having regular bowel movements no complaints with urination.  No complaints of chest pain or shortness of breath.     She has follow-up with Ortho this week he continues to be nonweightbearing but now has orders for active range of motion of her left leg. She feels this is going well.     Allergies, and PMH/PSH reviewed in EPIC today.  REVIEW OF SYSTEMS:  4 point ROS including Respiratory, CV, GI and , other than that noted in the HPI,  is negative    Objective:   /68   Pulse 84   Temp 96.9  F (36.1  C)   Resp 18   Wt 63.6 kg (140 lb 3.2 oz)   SpO2 91%   BMI 27.38 kg/m    GENERAL APPEARANCE:  Alert, in no distress, pleasant, cooperative  EYES: no discharge or mattering on lids or lashes noted  ENT:  moist mucous membranes, hearing acuity intact  NECK: supple, symmetrical  RESP: no respiratory distress, Lung sounds clear, patient is on room air  CV:  rate and rhythm regular, no murmur. Edema none in bilateral lower extremities. VASCULAR: warm extremities without open areas.  ABDOMEN: normal bowel sounds, soft, nontender.  M/S:   Gait and station: unsafe without assistance, cast on LLE  SKIN:  Inspection and palpation of skin and subcutaneous tissue: skin warm, dry and intact without rashes  NEURO: no facial asymmetry, no speech deficits and able to follow directions, moves all extremities symmetrically  PSYCH:  insight and judgement intact, memory mild impairment? affect and mood normal    CBC RESULTS:   Recent Labs   Lab Test 05/20/24  0720 05/16/24  0710   WBC 6.6 7.4   RBC 3.88 3.76*   HGB 9.2* 8.9*   HCT 30.6* 29.6*   MCV 79 79   MCH 23.7* 23.7*    MCHC 30.1* 30.1*   RDW 23.4* 22.5*   * 592*       Last Basic Metabolic Panel:  Recent Labs   Lab Test 05/20/24  0720 05/24/23  1737    134*   POTASSIUM 3.9 4.0   CHLORIDE 103 100   VINCE 8.9 9.3   CO2 25 22   BUN 9.2 12.4   CR 0.59 0.73   GLC 88 153*       Liver Function Studies -   Recent Labs   Lab Test 05/24/23  1737   PROTTOTAL 7.3   ALBUMIN 4.0   BILITOTAL 0.2   ALKPHOS 103   AST 23   ALT 23       TSH   Date Value Ref Range Status   12/15/2021 1.86 0.40 - 4.00 mU/L Final     Assessment/Plan:  Left tibial plateau fracture   Sustained from a fall. s/p closed reduction and casting (5/10). Pain well controlled today.  She followed up with ortho this week and is now able to do active ROM to the left leg.   -Ongoing NWB LLE  -Continue PRN Tylenol 650 mg q4hr prn for pain control  - PT/OT  - F/up with ortho as scheduled at [facility]    History of PE  Patient with history of two PEs. On Coumadin. Bridged with Lovenox. INR  has been stable.   - Continue with coumadin and recheck INR on 6/21. (Home dose of coumadin is 5 mg alternating with 7.5 mg).     Microcytic anemia  Hgb 8.2 on admission. Baseline ~8. Hx of GIB in Fall 2023. HGB here at the TCU is 9.2.  Iron 27 and saturation index of 8 on 5/20/2024 therefore iron supplement was started.  - recheck HGB periodically throughout the stay.   - Started on ferrous sulfate here at the TCU    Hypertension  Amlodipine was decreased  earlier on in the stay and pressures still soft in the low 100s therefore decreased again down to 5 mg and now better controlled in the 120s.   - Continue Amlodipine  to 5 mg  - Continue to monitor blood pressure and heart rate, adjust medications as needed.    Hx of neurocognitive disorder  Meningioma  Appears to have mild impairment. Currently lives in a town home independently. Calm and pleasant. SLUMS 13/30, CPT 4.6/5.6.  She currently lives at home independently so the scores indicate that she will need more services after  discharge.  -Continue with Lexapro   -Daughter is planning for her to return back to her previously independent home.  She is looking into assisted living facilities    Physical deconditioning  Secondary to recent hospitalization and underlying medical conditions.   --ongoing PT/OT for strengthening.     MED REC REQUIRED  Post Medication Reconciliation Status: medication reconcilation previously completed during another office visit      Electronically signed by: CHRISTINE Carrasco CNP

## 2024-06-27 NOTE — LETTER
6/27/2024      Lawanda Crawford  [address] [place] [address]        M [facility] GERIATRICS    Chief Complaint   Patient presents with     RECHECK     HPI:  Lawanda Crawford is a 83 year old  (1940), who is being seen today for an episodic care visit at: [facility] ([facility]) [[id number]]. Today's concern is:     Lawanda was visited today while in her room sitting in the recliner, working on word search. Having regular bowel movements no complaints with urination.  No complaints of chest pain or shortness of breath.     She has follow-up with Ortho this week he continues to be nonweightbearing but now has orders for active range of motion of her left leg. She feels this is going well.     Allergies, and PMH/PSH reviewed in EPIC today.  REVIEW OF SYSTEMS:  4 point ROS including Respiratory, CV, GI and , other than that noted in the HPI,  is negative    Objective:   /68   Pulse 84   Temp 96.9  F (36.1  C)   Resp 18   Wt 63.6 kg (140 lb 3.2 oz)   SpO2 91%   BMI 27.38 kg/m    GENERAL APPEARANCE:  Alert, in no distress, pleasant, cooperative  EYES: no discharge or mattering on lids or lashes noted  ENT:  moist mucous membranes, hearing acuity intact  NECK: supple, symmetrical  RESP: no respiratory distress, Lung sounds clear, patient is on room air  CV:  rate and rhythm regular, no murmur. Edema none in bilateral lower extremities. VASCULAR: warm extremities without open areas.  ABDOMEN: normal bowel sounds, soft, nontender.  M/S:   Gait and station: unsafe without assistance, cast on LLE  SKIN:  Inspection and palpation of skin and subcutaneous tissue: skin warm, dry and intact without rashes  NEURO: no facial asymmetry, no speech deficits and able to follow directions, moves all extremities symmetrically  PSYCH:  insight and judgement intact, memory mild impairment? affect and mood normal    CBC RESULTS:   Recent Labs   Lab Test 05/20/24  0720 05/16/24  0710   WBC 6.6  7.4   RBC 3.88 3.76*   HGB 9.2* 8.9*   HCT 30.6* 29.6*   MCV 79 79   MCH 23.7* 23.7*   MCHC 30.1* 30.1*   RDW 23.4* 22.5*   * 592*       Last Basic Metabolic Panel:  Recent Labs   Lab Test 05/20/24  0720 05/24/23  1737    134*   POTASSIUM 3.9 4.0   CHLORIDE 103 100   VINCE 8.9 9.3   CO2 25 22   BUN 9.2 12.4   CR 0.59 0.73   GLC 88 153*       Liver Function Studies -   Recent Labs   Lab Test 05/24/23  1737   PROTTOTAL 7.3   ALBUMIN 4.0   BILITOTAL 0.2   ALKPHOS 103   AST 23   ALT 23       TSH   Date Value Ref Range Status   12/15/2021 1.86 0.40 - 4.00 mU/L Final     Assessment/Plan:  Left tibial plateau fracture   Sustained from a fall. s/p closed reduction and casting (5/10). Pain well controlled today.  She followed up with ortho this week and is now able to do active ROM to the left leg.   -Ongoing NWB LLE  -Continue PRN Tylenol 650 mg q4hr prn for pain control  - PT/OT  - F/up with ortho as scheduled at [facility]    History of PE  Patient with history of two PEs. On Coumadin. Bridged with Lovenox. INR  has been stable.   - Continue with coumadin and recheck INR on 6/21. (Home dose of coumadin is 5 mg alternating with 7.5 mg).     Microcytic anemia  Hgb 8.2 on admission. Baseline ~8. Hx of GIB in Fall 2023. HGB here at the TCU is 9.2.  Iron 27 and saturation index of 8 on 5/20/2024 therefore iron supplement was started.  - recheck HGB periodically throughout the stay.   - Started on ferrous sulfate here at the TCU    Hypertension  Amlodipine was decreased  earlier on in the stay and pressures still soft in the low 100s therefore decreased again down to 5 mg and now better controlled in the 120s.   - Continue Amlodipine  to 5 mg  - Continue to monitor blood pressure and heart rate, adjust medications as needed.    Hx of neurocognitive disorder  Meningioma  Appears to have mild impairment. Currently lives in a town home independently. Calm and pleasant. SLUMS 13/30, CPT 4.6/5.6.  She currently lives at home  independently so the scores indicate that she will need more services after discharge.  -Continue with Lexapro   -Daughter is planning for her to return back to her previously independent home.  She is looking into assisted living facilities    Physical deconditioning  Secondary to recent hospitalization and underlying medical conditions.   --ongoing PT/OT for strengthening.     MED REC REQUIRED  Post Medication Reconciliation Status: medication reconcilation previously completed during another office visit      Electronically signed by: CHRISTINE Carrasco CNP                               Sincerely,        CHRISTINE Carrasco CNP

## 2024-06-30 ENCOUNTER — LAB REQUISITION (OUTPATIENT)
Dept: LAB | Facility: CLINIC | Age: 84
End: 2024-06-30
Payer: MEDICARE

## 2024-06-30 DIAGNOSIS — I48.91 UNSPECIFIED ATRIAL FIBRILLATION (H): ICD-10-CM

## 2024-07-01 ENCOUNTER — TRANSITIONAL CARE UNIT VISIT (OUTPATIENT)
Dept: GERIATRICS | Facility: CLINIC | Age: 84
End: 2024-07-01
Payer: MEDICARE

## 2024-07-01 VITALS
SYSTOLIC BLOOD PRESSURE: 154 MMHG | OXYGEN SATURATION: 95 % | RESPIRATION RATE: 18 BRPM | HEART RATE: 78 BPM | DIASTOLIC BLOOD PRESSURE: 67 MMHG | TEMPERATURE: 97.3 F | WEIGHT: 140 LBS | BODY MASS INDEX: 27.34 KG/M2

## 2024-07-01 DIAGNOSIS — Z51.81 ENCOUNTER FOR THERAPEUTIC DRUG MONITORING: ICD-10-CM

## 2024-07-01 DIAGNOSIS — Z79.01 ANTICOAGULATION MONITORING, INR RANGE 2-3: ICD-10-CM

## 2024-07-01 DIAGNOSIS — Z86.711 HISTORY OF PULMONARY EMBOLISM: Primary | ICD-10-CM

## 2024-07-01 DIAGNOSIS — Z79.01 ENCOUNTER FOR CURRENT LONG-TERM USE OF ANTICOAGULANTS: ICD-10-CM

## 2024-07-01 LAB — INR PPP: 1.95 (ref 0.85–1.15)

## 2024-07-01 PROCEDURE — P9604 ONE-WAY ALLOW PRORATED TRIP: HCPCS | Mod: ORL | Performed by: NURSE PRACTITIONER

## 2024-07-01 PROCEDURE — 85610 PROTHROMBIN TIME: CPT | Mod: ORL | Performed by: NURSE PRACTITIONER

## 2024-07-01 PROCEDURE — 99308 SBSQ NF CARE LOW MDM 20: CPT | Performed by: NURSE PRACTITIONER

## 2024-07-01 NOTE — LETTER
7/1/2024      Lawanda Crawford  [address]  [place] [address]        [facility] GERIATRICS  [place] Medical Record Number:  [id number]  Place of Service where encounter took place: [facility] ([facility]) [[id number]]    HPI:    Lawanda Crawford is a 83 year old  (1940), who is being seen today for an episodic care visit at the above location. Today's concern is INR/Coumadin management for A. Fib    ROS/Subjective:  Bleeding Signs/Symptoms:  None  Thromboembolic Signs/Symptoms:  None  Medication Changes:  No  Dietary Changes:  No  Activity Changes: No  Bacterial/Viral Infection:  No  Missed Coumadin Doses:  None  On ASA: No  Other Concerns:  No    OBJECTIVE:  BP (!) 154/67   Pulse 78   Temp 97.3  F (36.3  C)   Resp 18   Wt 63.5 kg (140 lb)   SpO2 95%   BMI 27.34 kg/m    Last INR: Previous INR on 6/24 was 2.4 and she's been on 4mg daily. INR on 6/22 was 2.6 and she got 3mg daily. INR on 6/14 was 2.3 and she got 5mg Tues and Thurs and 4mg AOD.   INR Today:  1.95    ASSESSMENT:     History of pulmonary embolism  Anticoagulation monitoring, INR range 2-3  Encounter for therapeutic drug monitoring  Encounter for current long-term use of anticoagulants  Therapeutic INR for goal of 2-3    PLAN/ORDERS:   New Dose: 5 mg M/Th and 4 mg AOD    Next INR: 2 weeks      Electronically signed by:  CHRISTINE Carrasco CNP       Sincerely,        CHRISTINE Carrasco CNP

## 2024-07-02 NOTE — PROGRESS NOTES
[facility] GERIATRICS  [place] Medical Record Number:  [id number]  Place of Service where encounter took place: [facility] ([facility] [[id number]]    HPI:    Lawanda Crawford is a 83 year old  (1940), who is being seen today for an episodic care visit at the above location. Today's concern is INR/Coumadin management for A. Fib    ROS/Subjective:  Bleeding Signs/Symptoms:  None  Thromboembolic Signs/Symptoms:  None  Medication Changes:  No  Dietary Changes:  No  Activity Changes: No  Bacterial/Viral Infection:  No  Missed Coumadin Doses:  None  On ASA: No  Other Concerns:  No    OBJECTIVE:  BP (!) 154/67   Pulse 78   Temp 97.3  F (36.3  C)   Resp 18   Wt 63.5 kg (140 lb)   SpO2 95%   BMI 27.34 kg/m    Last INR: Previous INR on 6/24 was 2.4 and she's been on 4mg daily. INR on 6/22 was 2.6 and she got 3mg daily. INR on 6/14 was 2.3 and she got 5mg Tues and Thurs and 4mg AOD.   INR Today:  1.95    ASSESSMENT:     History of pulmonary embolism  Anticoagulation monitoring, INR range 2-3  Encounter for therapeutic drug monitoring  Encounter for current long-term use of anticoagulants  Therapeutic INR for goal of 2-3    PLAN/ORDERS:   New Dose: 5 mg M/Th and 4 mg AOD    Next INR: 2 weeks      Electronically signed by:  CHRISTINE Carrasco CNP

## 2024-07-05 ENCOUNTER — DISCHARGE SUMMARY NURSING HOME (OUTPATIENT)
Dept: GERIATRICS | Facility: CLINIC | Age: 84
End: 2024-07-05
Payer: MEDICARE

## 2024-07-05 VITALS
RESPIRATION RATE: 16 BRPM | TEMPERATURE: 97.8 F | DIASTOLIC BLOOD PRESSURE: 66 MMHG | BODY MASS INDEX: 27.38 KG/M2 | HEART RATE: 86 BPM | OXYGEN SATURATION: 92 % | WEIGHT: 140.2 LBS | SYSTOLIC BLOOD PRESSURE: 116 MMHG

## 2024-07-05 DIAGNOSIS — Z47.89 AFTERCARE FOLLOWING SURGERY OF THE MUSCULOSKELETAL SYSTEM: ICD-10-CM

## 2024-07-05 DIAGNOSIS — R53.81 PHYSICAL DECONDITIONING: ICD-10-CM

## 2024-07-05 DIAGNOSIS — Z86.711 HISTORY OF PULMONARY EMBOLISM: ICD-10-CM

## 2024-07-05 DIAGNOSIS — I10 ESSENTIAL HYPERTENSION: ICD-10-CM

## 2024-07-05 DIAGNOSIS — D64.9 ANEMIA, UNSPECIFIED TYPE: ICD-10-CM

## 2024-07-05 DIAGNOSIS — S82.142D CLOSED FRACTURE OF LEFT TIBIAL PLATEAU WITH ROUTINE HEALING, SUBSEQUENT ENCOUNTER: Primary | ICD-10-CM

## 2024-07-05 DIAGNOSIS — R41.9 NEUROCOGNITIVE DISORDER: ICD-10-CM

## 2024-07-05 PROCEDURE — 99316 NF DSCHRG MGMT 30 MIN+: CPT | Performed by: NURSE PRACTITIONER

## 2024-07-05 NOTE — PROGRESS NOTES
[facility] GERIATRICS DISCHARGE SUMMARY  PATIENT'S NAME: Lawanda Crawford  YOB: 1940  MEDICAL RECORD NUMBER:  [id number]  [facility] [[id number]]    PRIMARY CARE PROVIDER AND CLINIC RESPONSIBLE AFTER TRANSFER:   Physician No Ref-Primary, No address on file    Non-FMG Provider     Transferring providers: CHRISTINE Carrasco CNP, Jai Harris MD  Recent Hospitalization/ED:  Hospital  [facility] [place] to 5/14/2024.  Date of SNF Admission: May 14, 2024  Date of SNF (anticipated) Discharge:  7/10/24  Discharged to: with family daughter until AL is found  Cognitive Scores: SLUMS: 11/30 and CPT: 4.6/5.6  Physical Function: NWB to LLE but able to do active ROM seated L knee flex 60 degrees. Able to do upper body ADLs seated with set up.  DME: No DME needed    CODE STATUS/ADVANCE DIRECTIVES DISCUSSION:  Full Code   [facility]    NURSING FACILITY COURSE   Lawanda Crawford  is a 83 year old  (1940) female with a medical history of PE, hypertension and GIB. She was driving to [place] with her daughter where she had a fall and sustained a Left tibial plateau fracture. They drove back to [place] where she went to [facility] and is now s/p closed reduction. Had acute kidney injury that resolved. Chest pain with unremarkable work up. She was admitted to the above facility from  [facility] Hospital stay 5/9/2024 through 5/14/2024     Left tibial plateau fracture   Sustained from a fall. s/p closed reduction and casting (5/10). Pain well controlled today.  She followed up with ortho recently and is now able to do active ROM to the left leg.   -Ongoing NWB LLE but can do active ROM up to 90 degrees.  -Continue PRN Tylenol 650 mg q4hr prn for pain control  - PT/OT  - F/up with ortho as scheduled at [facility]    History of PE  Patient with history of two PEs. On Coumadin. INR has been stable.   - Continue with coumadin (Home dose of coumadin  is 5 mg alternating with 7.5 mg). She has been getting 4-5 mg daily here at the TCU and INR has been around 2.4-2.5.    Microcytic anemia  Hgb 8.2 on admission. Baseline ~8. Hx of GIB in Fall 2023. HGB here at the TCU is 9.2.  Iron 27 and saturation index of 8 on 5/20/2024 therefore iron supplement was started.  Hemoglobin   Date Value Ref Range Status   05/20/2024 9.2 (L) 11.7 - 15.7 g/dL Final     Hypertension  Amlodipine was decreased  earlier on in the stay and pressures still soft in the low 100s therefore decreased again down to 5 mg and now better controlled in the 120s. Could consider decreasing again to 2.5 if pre-med pressures are 120s.  - Continue Amlodipine 5 mg daily  - Continue to monitor blood pressure and heart rate, adjust medications as needed.    Hx of neurocognitive disorder  Meningioma  Appears to have mild impairment. Currently lives in a town home independently. Calm and pleasant. SLUMS 13/30, CPT 4.6/5.6.    -Continue with Lexapro   -Daughter is planning for her to return back to her previously independent home.  She is looking into assisted living facilities     Physical deconditioning  Secondary to recent hospitalization and underlying medical conditions.   --ongoing PT/OT for strengthening.     Discharge Medications:  MED REC REQUIRED  Post Medication Reconciliation Status: medication reconcilation previously completed during another office visit       Current Outpatient Medications   Medication Sig Dispense Refill    acetaminophen (TYLENOL) 325 MG tablet Take 325-650 mg by mouth every 6 hours as needed for mild pain      amLODIPine (NORVASC) 10 MG tablet Take 5 mg by mouth daily      calcium carbonate (OS-VINCE) 500 MG TABS Take 1 tablet by mouth daily      escitalopram (LEXAPRO) 20 MG tablet Take 20 mg by mouth daily      melatonin 3 MG tablet Take 3 mg by mouth nightly as needed for sleep      multivitamin w/minerals (THERA-VIT-M) tablet Take 1 tablet by mouth daily      nystatin  (MYCOSTATIN) 027265 UNIT/GM external powder Apply topically daily      ondansetron (ZOFRAN) 4 MG tablet Take 4 mg by mouth every 6 hours as needed for nausea      senna (SENOKOT) 8.6 MG tablet Take 1 tablet by mouth 2 times daily as needed for constipation      simvastatin (ZOCOR) 20 MG tablet Take 20 mg by mouth At Bedtime      Vitamin D3 (CHOLECALCIFEROL) 25 mcg (1000 units) tablet Take 1 tablet by mouth daily      warfarin ANTICOAGULANT (COUMADIN) 5 MG tablet Take 5 mg (5 mg x 1 TABLET) BY MOUTH every Sun, Tue, Thu; AND TAKE 7.5 mg (5 mg x 1.5 TABLETS) all other days OR as directed by INR clinic.         Controlled medications:   not applicable/none     Past Medical History: No past medical history on file.  Physical Exam:   Vitals: /66   Pulse 86   Temp 97.8  F (36.6  C)   Resp 16   Wt 63.6 kg (140 lb 3.2 oz)   SpO2 92%   BMI 27.38 kg/m    BMI: Body mass index is 27.38 kg/m .  GENERAL APPEARANCE:  Alert, in no distress, pleasant, cooperative  EYES: no discharge or mattering on lids or lashes noted  ENT:  moist mucous membranes, hearing acuity intact  NECK: supple, symmetrical  RESP: no respiratory distress, Lung sounds clear, patient is on room air  CV:  rate and rhythm regular, no murmur. Edema none in bilateral lower extremities. VASCULAR: warm extremities without open areas.  ABDOMEN: normal bowel sounds, soft, nontender.  M/S:   Gait and station: unsafe without assistance, cast on LLE  SKIN:  Inspection and palpation of skin and subcutaneous tissue: skin warm, dry and intact without rashes  NEURO: no facial asymmetry, no speech deficits and able to follow directions, moves all extremities symmetrically  PSYCH:  insight and judgement intact, memory mild impairment? affect and mood normal    SNF labs:   CBC RESULTS:   Recent Labs   Lab Test 05/20/24  0720 05/16/24  0710   WBC 6.6 7.4   RBC 3.88 3.76*   HGB 9.2* 8.9*   HCT 30.6* 29.6*   MCV 79 79   MCH 23.7* 23.7*   MCHC 30.1* 30.1*   RDW 23.4*  22.5*   * 592*       Last Basic Metabolic Panel:  Recent Labs   Lab Test 05/20/24  0720 05/24/23  1737    134*   POTASSIUM 3.9 4.0   CHLORIDE 103 100   VINCE 8.9 9.3   CO2 25 22   BUN 9.2 12.4   CR 0.59 0.73   GLC 88 153*       Liver Function Studies -   Recent Labs   Lab Test 05/24/23  1737   PROTTOTAL 7.3   ALBUMIN 4.0   BILITOTAL 0.2   ALKPHOS 103   AST 23   ALT 23       TSH   Date Value Ref Range Status   12/15/2021 1.86 0.40 - 4.00 mU/L Final       DISCHARGE PLAN:  Follow up labs: No labs orders/due  Medical Follow Up:      Follow up with primary care provider in 1 weeks  [facility] scheduled appointments:  Appointments in Next Year      Jul 05, 2024 9:30 AM  Discharge Summary with CHRISTINE Shepherd CNP  [facility] ([facility] ) [phone]           Discharge Services: Home Care:  Occupational Therapy, Physical Therapy, and Registered Nurse  Discharge Instructions Verbalized to Patient at Discharge:   Weight bearing restrictions:  Non-weight bearing.     TOTAL DISCHARGE TIME:   Greater than 30 minutes  Electronically signed by:  CHRISTINE Carrasco CNP     Home care Face to Face documentation done in [facility] attached to [facility] for [facility].     Documentation of Face to Face and Certification for Home Health Services    I certify that patient: Lawanda Crawford is under my care and that I, or a nurse practitioner or physician's assistant working with me, had a face-to-face encounter that meets the physician face-to-face encounter requirements with this patient on: 7/5/2024.    This encounter with the patient was in whole, or in part, for the following medical condition, which is the primary reason for home health care: tibial platue fracture, cog impairment.    I certify that, based on my findings, the following services are medically necessary home health services: Nursing, Occupational Therapy, and Physical  Therapy.    My clinical findings support the need for the above services because: Nurse is needed: to follow INRs and monitor BPs., Occupational Therapy Services are needed to assess and treat cognitive ability and address ADL safety due to impairment in upper body strength and cog impairment. Assess the home for DME needs as she will be staying with her daughter., and Physical Therapy Services are needed to assess and treat the following functional impairments: remains NWB to LLE.    Further, I certify that my clinical findings support that this patient is homebound (i.e. absences from home require considerable and taxing effort and are for medical reasons or [employer] services or infrequently or of short duration when for other reasons) because: Requires assistance of another person or specialized equipment to access medical services because patient: Range of motion limitations prevents ability to exit home safely. and Requires supervision of another for safe transfer...    Based on the above findings. I certify that this patient is confined to the home and needs intermittent skilled nursing care, physical therapy and/or speech therapy.  The patient is under my care, and I have initiated the establishment of the plan of care.  This patient will be followed by a physician who will periodically review the plan of care.  Physician/Provider to provide follow up care: No Ref-Primary, Physician    Attending hospital physician (the Medicare certified MATT provider): Dr. Jai Harris  Physician Signature: See electronic signature associated with these discharge orders.  Date: 7/8/2024

## 2024-07-05 NOTE — LETTER
7/5/2024      Lawanda Crawford  [address] [address] [address]        [facility] GERIATRICS DISCHARGE SUMMARY  PATIENT'S NAME: Lawanda Crawford  YOB: 1940  MEDICAL RECORD NUMBER:  [id number]  Place of Service where encounter took place:  [facility] ([facility]) [[id number]]    PRIMARY CARE PROVIDER AND CLINIC RESPONSIBLE AFTER TRANSFER:   Physician No Ref-Primary, No address on file    Non-FMG Provider     Transferring providers: CHRISTINE Carrasco CNP, Jai Harris MD  Recent Hospitalization/ED:  Hospital  [facility] stay 5/9/2024 to 5/14/2024.  Date of SNF Admission: May 14, 2024  Date of SNF (anticipated) Discharge:  7/10/24  Discharged to: with family daughter until AL is found  Cognitive Scores: SLUMS: 11/30 and CPT: 4.6/5.6  Physical Function: NWB to LLE but able to do active ROM seated L knee flex 60 degrees. Able to do upper body ADLs seated with set up.  DME: No DME needed    CODE STATUS/ADVANCE DIRECTIVES DISCUSSION:  Full Code   [facility]    NURSING FACILITY COURSE   Lawanda Crawford  is a 83 year old  (1940) female with a medical history of PE, hypertension and GIB. She was driving to [place] with her daughter where she had a fall and sustained a Left tibial plateau fracture. They drove back to [place] where she went to [facility] and is now s/p closed reduction. Had acute kidney injury that resolved. Chest pain with unremarkable work up. She was admitted to the above facility from  [facility]. Hospital stay 5/9/2024 through 5/14/2024     Left tibial plateau fracture   Sustained from a fall. s/p closed reduction and casting (5/10). Pain well controlled today.  She followed up with ortho recently and is now able to do active ROM to the left leg.   -Ongoing NWB LLE but can do active ROM up to 90 degrees.  -Continue PRN Tylenol 650 mg q4hr prn for pain control  - PT/OT  - F/up with ortho as scheduled at [facility]    History of PE  Patient with history of two PEs.  On Coumadin. INR has been stable.   - Continue with coumadin (Home dose of coumadin is 5 mg alternating with 7.5 mg). She has been getting 4-5 mg daily here at the TCU and INR has been around 2.4-2.5.    Microcytic anemia  Hgb 8.2 on admission. Baseline ~8. Hx of GIB in Fall 2023. HGB here at the TCU is 9.2.  Iron 27 and saturation index of 8 on 5/20/2024 therefore iron supplement was started.  Hemoglobin   Date Value Ref Range Status   05/20/2024 9.2 (L) 11.7 - 15.7 g/dL Final     Hypertension  Amlodipine was decreased  earlier on in the stay and pressures still soft in the low 100s therefore decreased again down to 5 mg and now better controlled in the 120s. Could consider decreasing again to 2.5 if pre-med pressures are 120s.  - Continue Amlodipine 5 mg daily  - Continue to monitor blood pressure and heart rate, adjust medications as needed.    Hx of neurocognitive disorder  Meningioma  Appears to have mild impairment. Currently lives in a town home independently. Calm and pleasant. SLUMS 13/30, CPT 4.6/5.6.    -Continue with Lexapro   -Daughter is planning for her to return back to her previously independent home.  She is looking into assisted living facilities     Physical deconditioning  Secondary to recent hospitalization and underlying medical conditions.   --ongoing PT/OT for strengthening.     Discharge Medications:  MED REC REQUIRED  Post Medication Reconciliation Status: medication reconcilation previously completed during another office visit       Current Outpatient Medications   Medication Sig Dispense Refill     acetaminophen (TYLENOL) 325 MG tablet Take 325-650 mg by mouth every 6 hours as needed for mild pain       amLODIPine (NORVASC) 10 MG tablet Take 5 mg by mouth daily       calcium carbonate (OS-VINCE) 500 MG TABS Take 1 tablet by mouth daily       escitalopram (LEXAPRO) 20 MG tablet Take 20 mg by mouth daily       melatonin 3 MG tablet Take 3 mg by mouth nightly as needed for sleep        multivitamin w/minerals (THERA-VIT-M) tablet Take 1 tablet by mouth daily       nystatin (MYCOSTATIN) 197660 UNIT/GM external powder Apply topically daily       ondansetron (ZOFRAN) 4 MG tablet Take 4 mg by mouth every 6 hours as needed for nausea       senna (SENOKOT) 8.6 MG tablet Take 1 tablet by mouth 2 times daily as needed for constipation       simvastatin (ZOCOR) 20 MG tablet Take 20 mg by mouth At Bedtime       Vitamin D3 (CHOLECALCIFEROL) 25 mcg (1000 units) tablet Take 1 tablet by mouth daily       warfarin ANTICOAGULANT (COUMADIN) 5 MG tablet Take 5 mg (5 mg x 1 TABLET) BY MOUTH every Sun, Tue, Thu; AND TAKE 7.5 mg (5 mg x 1.5 TABLETS) all other days OR as directed by INR clinic.         Controlled medications:   not applicable/none     Past Medical History: No past medical history on file.  Physical Exam:   Vitals: /66   Pulse 86   Temp 97.8  F (36.6  C)   Resp 16   Wt 63.6 kg (140 lb 3.2 oz)   SpO2 92%   BMI 27.38 kg/m    BMI: Body mass index is 27.38 kg/m .  GENERAL APPEARANCE:  Alert, in no distress, pleasant, cooperative  EYES: no discharge or mattering on lids or lashes noted  ENT:  moist mucous membranes, hearing acuity intact  NECK: supple, symmetrical  RESP: no respiratory distress, Lung sounds clear, patient is on room air  CV:  rate and rhythm regular, no murmur. Edema none in bilateral lower extremities. VASCULAR: warm extremities without open areas.  ABDOMEN: normal bowel sounds, soft, nontender.  M/S:   Gait and station: unsafe without assistance, cast on LLE  SKIN:  Inspection and palpation of skin and subcutaneous tissue: skin warm, dry and intact without rashes  NEURO: no facial asymmetry, no speech deficits and able to follow directions, moves all extremities symmetrically  PSYCH:  insight and judgement intact, memory mild impairment? affect and mood normal    SNF labs:   CBC RESULTS:   Recent Labs   Lab Test 05/20/24  0720 05/16/24  0710   WBC 6.6 7.4   RBC 3.88 3.76*   HGB  9.2* 8.9*   HCT 30.6* 29.6*   MCV 79 79   MCH 23.7* 23.7*   MCHC 30.1* 30.1*   RDW 23.4* 22.5*   * 592*       Last Basic Metabolic Panel:  Recent Labs   Lab Test 05/20/24  0720 05/24/23  1737    134*   POTASSIUM 3.9 4.0   CHLORIDE 103 100   VINCE 8.9 9.3   CO2 25 22   BUN 9.2 12.4   CR 0.59 0.73   GLC 88 153*       Liver Function Studies -   Recent Labs   Lab Test 05/24/23  1737   PROTTOTAL 7.3   ALBUMIN 4.0   BILITOTAL 0.2   ALKPHOS 103   AST 23   ALT 23       TSH   Date Value Ref Range Status   12/15/2021 1.86 0.40 - 4.00 mU/L Final       DISCHARGE PLAN:  Follow up labs: No labs orders/due  Medical Follow Up:      Follow up with primary care provider in 1 weeks  [facility] scheduled appointments:  Appointments in Next Year      Jul 05, 2024 9:30 AM  Discharge Summary with CHRISTINE Shepherd CNP  [facility] ([facility] ) [phone]           Discharge Services: Home Care:  Occupational Therapy, Physical Therapy, and Registered Nurse  Discharge Instructions Verbalized to Patient at Discharge:   Weight bearing restrictions:  Non-weight bearing.     TOTAL DISCHARGE TIME:   Greater than 30 minutes  Electronically signed by:  CHRISTINE Carrasco CNP     Home care Face to Face documentation done in [facility] attached to Home care orders for [facility].     Documentation of Face to Face and Certification for Home Health Services    I certify that patient: Lawanda Crawford is under my care and that I, or a nurse practitioner or physician's assistant working with me, had a face-to-face encounter that meets the physician face-to-face encounter requirements with this patient on: 7/5/2024.    This encounter with the patient was in whole, or in part, for the following medical condition, which is the primary reason for home health care: tibial platue fracture, cog impairment.    I certify that, based on my findings, the following services are  medically necessary home health services: Nursing, Occupational Therapy, and Physical Therapy.    My clinical findings support the need for the above services because: Nurse is needed: to follow INRs and monitor BPs., Occupational Therapy Services are needed to assess and treat cognitive ability and address ADL safety due to impairment in upper body strength and cog impairment. Assess the home for DME needs as she will be staying with her daughter., and Physical Therapy Services are needed to assess and treat the following functional impairments: remains NWB to LLE.    Further, I certify that my clinical findings support that this patient is homebound (i.e. absences from home require considerable and taxing effort and are for medical reasons or [employer] services or infrequently or of short duration when for other reasons) because: Requires assistance of another person or specialized equipment to access medical services because patient: Range of motion limitations prevents ability to exit home safely. and Requires supervision of another for safe transfer...    Based on the above findings. I certify that this patient is confined to the home and needs intermittent skilled nursing care, physical therapy and/or speech therapy.  The patient is under my care, and I have initiated the establishment of the plan of care.  This patient will be followed by a physician who will periodically review the plan of care.  Physician/Provider to provide follow up care: No Ref-Primary, Physician    Attending hospital physician (the Medicare certified [facility] provider): Dr. Jai Harris  Physician Signature: See electronic signature associated with these discharge orders.  Date: 7/8/2024               Sincerely,        CHRISTINE Carrasco CNP

## 2024-07-08 ENCOUNTER — LAB REQUISITION (OUTPATIENT)
Dept: LAB | Facility: CLINIC | Age: 84
End: 2024-07-08
Payer: MEDICARE

## 2024-07-08 DIAGNOSIS — D64.9 ANEMIA, UNSPECIFIED: ICD-10-CM

## 2024-07-09 ENCOUNTER — LAB REQUISITION (OUTPATIENT)
Dept: LAB | Facility: CLINIC | Age: 84
End: 2024-07-09
Payer: MEDICARE

## 2024-07-09 DIAGNOSIS — D64.9 ANEMIA, UNSPECIFIED: ICD-10-CM

## 2024-07-10 LAB
BASOPHILS # BLD AUTO: 0.1 10E3/UL (ref 0–0.2)
BASOPHILS NFR BLD AUTO: 1 %
EOSINOPHIL # BLD AUTO: 1 10E3/UL (ref 0–0.7)
EOSINOPHIL NFR BLD AUTO: 14 %
ERYTHROCYTE [DISTWIDTH] IN BLOOD BY AUTOMATED COUNT: 22.7 % (ref 10–15)
HCT VFR BLD AUTO: 34 % (ref 35–47)
HGB BLD-MCNC: 10.7 G/DL (ref 11.7–15.7)
IMM GRANULOCYTES # BLD: 0 10E3/UL
IMM GRANULOCYTES NFR BLD: 0 %
LYMPHOCYTES # BLD AUTO: 2.5 10E3/UL (ref 0.8–5.3)
LYMPHOCYTES NFR BLD AUTO: 35 %
MCH RBC QN AUTO: 26.9 PG (ref 26.5–33)
MCHC RBC AUTO-ENTMCNC: 31.5 G/DL (ref 31.5–36.5)
MCV RBC AUTO: 85 FL (ref 78–100)
MONOCYTES # BLD AUTO: 0.5 10E3/UL (ref 0–1.3)
MONOCYTES NFR BLD AUTO: 7 %
NEUTROPHILS # BLD AUTO: 3 10E3/UL (ref 1.6–8.3)
NEUTROPHILS NFR BLD AUTO: 42 %
NRBC # BLD AUTO: 0 10E3/UL
NRBC BLD AUTO-RTO: 0 /100
PLATELET # BLD AUTO: 458 10E3/UL (ref 150–450)
RBC # BLD AUTO: 3.98 10E6/UL (ref 3.8–5.2)
WBC # BLD AUTO: 7 10E3/UL (ref 4–11)

## 2024-07-10 PROCEDURE — 36415 COLL VENOUS BLD VENIPUNCTURE: CPT | Mod: ORL | Performed by: NURSE PRACTITIONER

## 2024-07-10 PROCEDURE — P9604 ONE-WAY ALLOW PRORATED TRIP: HCPCS | Mod: ORL | Performed by: NURSE PRACTITIONER

## 2024-07-10 PROCEDURE — 85025 COMPLETE CBC W/AUTO DIFF WBC: CPT | Mod: ORL | Performed by: NURSE PRACTITIONER
